# Patient Record
Sex: FEMALE | Race: WHITE | NOT HISPANIC OR LATINO | Employment: FULL TIME | URBAN - METROPOLITAN AREA
[De-identification: names, ages, dates, MRNs, and addresses within clinical notes are randomized per-mention and may not be internally consistent; named-entity substitution may affect disease eponyms.]

---

## 2021-07-15 ENCOUNTER — TELEPHONE (OUTPATIENT)
Dept: MEDICAL GROUP | Facility: MEDICAL CENTER | Age: 41
End: 2021-07-15

## 2021-07-15 NOTE — TELEPHONE ENCOUNTER
Phone Number Called: [phone]    Call outcome: Spoke to patient regarding message below.    Message: Reminding of apt

## 2021-07-16 ENCOUNTER — OFFICE VISIT (OUTPATIENT)
Dept: MEDICAL GROUP | Facility: MEDICAL CENTER | Age: 41
End: 2021-07-16
Payer: COMMERCIAL

## 2021-07-16 VITALS
TEMPERATURE: 96.7 F | WEIGHT: 142.53 LBS | BODY MASS INDEX: 22.37 KG/M2 | HEART RATE: 77 BPM | HEIGHT: 67 IN | SYSTOLIC BLOOD PRESSURE: 102 MMHG | DIASTOLIC BLOOD PRESSURE: 64 MMHG | OXYGEN SATURATION: 98 % | RESPIRATION RATE: 18 BRPM

## 2021-07-16 DIAGNOSIS — F90.2 ATTENTION DEFICIT HYPERACTIVITY DISORDER (ADHD), COMBINED TYPE: ICD-10-CM

## 2021-07-16 DIAGNOSIS — Z12.31 ENCOUNTER FOR SCREENING MAMMOGRAM FOR MALIGNANT NEOPLASM OF BREAST: ICD-10-CM

## 2021-07-16 DIAGNOSIS — F41.1 GAD (GENERALIZED ANXIETY DISORDER): ICD-10-CM

## 2021-07-16 PROCEDURE — 99204 OFFICE O/P NEW MOD 45 MIN: CPT | Performed by: FAMILY MEDICINE

## 2021-07-16 RX ORDER — DEXTROAMPHETAMINE SACCHARATE, AMPHETAMINE ASPARTATE, DEXTROAMPHETAMINE SULFATE AND AMPHETAMINE SULFATE 5; 5; 5; 5 MG/1; MG/1; MG/1; MG/1
TABLET ORAL
COMMUNITY
Start: 2021-02-04 | End: 2021-07-16

## 2021-07-16 RX ORDER — DEXTROAMPHETAMINE SACCHARATE, AMPHETAMINE ASPARTATE, DEXTROAMPHETAMINE SULFATE AND AMPHETAMINE SULFATE 2.5; 2.5; 2.5; 2.5 MG/1; MG/1; MG/1; MG/1
10 TABLET ORAL 2 TIMES DAILY
Qty: 60 TABLET | Refills: 0 | Status: SHIPPED | OUTPATIENT
Start: 2021-07-16 | End: 2021-08-13 | Stop reason: SDUPTHER

## 2021-07-16 RX ORDER — DEXTROAMPHETAMINE SACCHARATE, AMPHETAMINE ASPARTATE, DEXTROAMPHETAMINE SULFATE AND AMPHETAMINE SULFATE 2.5; 2.5; 2.5; 2.5 MG/1; MG/1; MG/1; MG/1
5 TABLET ORAL 2 TIMES DAILY
COMMUNITY
End: 2021-07-16 | Stop reason: SDUPTHER

## 2021-07-16 SDOH — ECONOMIC STABILITY: TRANSPORTATION INSECURITY
IN THE PAST 12 MONTHS, HAS THE LACK OF TRANSPORTATION KEPT YOU FROM MEDICAL APPOINTMENTS OR FROM GETTING MEDICATIONS?: NO

## 2021-07-16 SDOH — ECONOMIC STABILITY: HOUSING INSECURITY: IN THE LAST 12 MONTHS, HOW MANY PLACES HAVE YOU LIVED?: 2

## 2021-07-16 SDOH — ECONOMIC STABILITY: INCOME INSECURITY: HOW HARD IS IT FOR YOU TO PAY FOR THE VERY BASICS LIKE FOOD, HOUSING, MEDICAL CARE, AND HEATING?: NOT HARD AT ALL

## 2021-07-16 SDOH — ECONOMIC STABILITY: TRANSPORTATION INSECURITY
IN THE PAST 12 MONTHS, HAS LACK OF TRANSPORTATION KEPT YOU FROM MEETINGS, WORK, OR FROM GETTING THINGS NEEDED FOR DAILY LIVING?: NO

## 2021-07-16 SDOH — ECONOMIC STABILITY: TRANSPORTATION INSECURITY
IN THE PAST 12 MONTHS, HAS LACK OF RELIABLE TRANSPORTATION KEPT YOU FROM MEDICAL APPOINTMENTS, MEETINGS, WORK OR FROM GETTING THINGS NEEDED FOR DAILY LIVING?: NO

## 2021-07-16 SDOH — HEALTH STABILITY: PHYSICAL HEALTH: ON AVERAGE, HOW MANY MINUTES DO YOU ENGAGE IN EXERCISE AT THIS LEVEL?: 40 MIN

## 2021-07-16 SDOH — ECONOMIC STABILITY: INCOME INSECURITY: IN THE LAST 12 MONTHS, WAS THERE A TIME WHEN YOU WERE NOT ABLE TO PAY THE MORTGAGE OR RENT ON TIME?: NO

## 2021-07-16 SDOH — ECONOMIC STABILITY: HOUSING INSECURITY
IN THE LAST 12 MONTHS, WAS THERE A TIME WHEN YOU DID NOT HAVE A STEADY PLACE TO SLEEP OR SLEPT IN A SHELTER (INCLUDING NOW)?: NO

## 2021-07-16 SDOH — ECONOMIC STABILITY: FOOD INSECURITY: WITHIN THE PAST 12 MONTHS, THE FOOD YOU BOUGHT JUST DIDN'T LAST AND YOU DIDN'T HAVE MONEY TO GET MORE.: NEVER TRUE

## 2021-07-16 SDOH — HEALTH STABILITY: PHYSICAL HEALTH: ON AVERAGE, HOW MANY DAYS PER WEEK DO YOU ENGAGE IN MODERATE TO STRENUOUS EXERCISE (LIKE A BRISK WALK)?: 7 DAYS

## 2021-07-16 SDOH — HEALTH STABILITY: MENTAL HEALTH
STRESS IS WHEN SOMEONE FEELS TENSE, NERVOUS, ANXIOUS, OR CAN'T SLEEP AT NIGHT BECAUSE THEIR MIND IS TROUBLED. HOW STRESSED ARE YOU?: ONLY A LITTLE

## 2021-07-16 SDOH — ECONOMIC STABILITY: FOOD INSECURITY: WITHIN THE PAST 12 MONTHS, YOU WORRIED THAT YOUR FOOD WOULD RUN OUT BEFORE YOU GOT MONEY TO BUY MORE.: NEVER TRUE

## 2021-07-16 ASSESSMENT — PATIENT HEALTH QUESTIONNAIRE - PHQ9: CLINICAL INTERPRETATION OF PHQ2 SCORE: 0

## 2021-07-16 ASSESSMENT — LIFESTYLE VARIABLES
HOW MANY STANDARD DRINKS CONTAINING ALCOHOL DO YOU HAVE ON A TYPICAL DAY: 3 OR 4
HOW OFTEN DO YOU HAVE SIX OR MORE DRINKS ON ONE OCCASION: WEEKLY
HOW OFTEN DO YOU HAVE A DRINK CONTAINING ALCOHOL: 4 OR MORE TIMES A WEEK

## 2021-07-16 ASSESSMENT — SOCIAL DETERMINANTS OF HEALTH (SDOH)
HOW OFTEN DO YOU ATTENT MEETINGS OF THE CLUB OR ORGANIZATION YOU BELONG TO?: PATIENT DECLINED
HOW OFTEN DO YOU ATTEND CHURCH OR RELIGIOUS SERVICES?: PATIENT DECLINED
IN A TYPICAL WEEK, HOW MANY TIMES DO YOU TALK ON THE PHONE WITH FAMILY, FRIENDS, OR NEIGHBORS?: MORE THAN THREE TIMES A WEEK
HOW HARD IS IT FOR YOU TO PAY FOR THE VERY BASICS LIKE FOOD, HOUSING, MEDICAL CARE, AND HEATING?: NOT HARD AT ALL
HOW OFTEN DO YOU ATTENT MEETINGS OF THE CLUB OR ORGANIZATION YOU BELONG TO?: PATIENT DECLINED
HOW MANY DRINKS CONTAINING ALCOHOL DO YOU HAVE ON A TYPICAL DAY WHEN YOU ARE DRINKING: 3 OR 4
HOW OFTEN DO YOU ATTEND CHURCH OR RELIGIOUS SERVICES?: PATIENT DECLINED
HOW OFTEN DO YOU HAVE A DRINK CONTAINING ALCOHOL: 4 OR MORE TIMES A WEEK
HOW OFTEN DO YOU GET TOGETHER WITH FRIENDS OR RELATIVES?: TWICE A WEEK
IN A TYPICAL WEEK, HOW MANY TIMES DO YOU TALK ON THE PHONE WITH FAMILY, FRIENDS, OR NEIGHBORS?: MORE THAN THREE TIMES A WEEK
WITHIN THE PAST 12 MONTHS, YOU WORRIED THAT YOUR FOOD WOULD RUN OUT BEFORE YOU GOT THE MONEY TO BUY MORE: NEVER TRUE
DO YOU BELONG TO ANY CLUBS OR ORGANIZATIONS SUCH AS CHURCH GROUPS UNIONS, FRATERNAL OR ATHLETIC GROUPS, OR SCHOOL GROUPS?: NO
HOW OFTEN DO YOU GET TOGETHER WITH FRIENDS OR RELATIVES?: TWICE A WEEK
HOW OFTEN DO YOU HAVE SIX OR MORE DRINKS ON ONE OCCASION: WEEKLY
DO YOU BELONG TO ANY CLUBS OR ORGANIZATIONS SUCH AS CHURCH GROUPS UNIONS, FRATERNAL OR ATHLETIC GROUPS, OR SCHOOL GROUPS?: NO

## 2021-07-16 ASSESSMENT — ENCOUNTER SYMPTOMS
FEVER: 0
DIARRHEA: 0
COUGH: 0
NERVOUS/ANXIOUS: 1
CHILLS: 0
DIZZINESS: 0
BLOOD IN STOOL: 0
PALPITATIONS: 0
WEAKNESS: 0
CONSTIPATION: 0
ABDOMINAL PAIN: 0
WHEEZING: 0
VOMITING: 0
SHORTNESS OF BREATH: 0
DEPRESSION: 0

## 2021-07-16 NOTE — PROGRESS NOTES
FAMILY MEDICINE VISIT                                                               Chief complaint::Diagnoses of Attention deficit hyperactivity disorder (ADHD), combined type, MARJORIE (generalized anxiety disorder), and Encounter for screening mammogram for malignant neoplasm of breast were pertinent to this visit.    History of present illness: Deb Kaye is a 41 y.o. female who presented establish care, medication refill.    She was following with Dr. Florentino in [place].  She reports that she was diagnosed with ADHD in February 2021.  She reports that she had a lot of stressors last year.  Her daughter attempted suicide and her daughter's father got paraplegic after accident.  She was having a lot of anxiety and then had a bowel movement for 5 weeks so she was seen by physician for anxiety symptoms and had evaluation for ADHD also.  Her sister had history of ADHD.  She was started on Adderall 10 mg twice daily.  Since that she started this medication she has been feeling a lot better and able to focus and her anxiety is also a lot better.  No side effects with this medication.  She also uses marijuana sometimes at bedtime for sleep.  Her constipation symptoms improved after starting Adderall.    Review of systems:     Review of Systems   Constitutional: Negative for chills, fever and malaise/fatigue.   Respiratory: Negative for cough, shortness of breath and wheezing.    Cardiovascular: Negative for chest pain, palpitations and leg swelling.   Gastrointestinal: Negative for abdominal pain, blood in stool, constipation, diarrhea and vomiting.   Skin: Negative for rash.   Neurological: Negative for dizziness and weakness.   Psychiatric/Behavioral: Negative for depression. The patient is nervous/anxious.         Past Medical, Surgical and Family History:    Past Medical History:   Diagnosis Date   • Constipation      Past Surgical History:   Procedure Laterality Date   • TUBAL LIGATION  05/2019   • BREAST IMPLANT  "REVISION      Breast implant placed in June 2020   • PRIMARY C SECTION       Family History   Problem Relation Age of Onset   • Arthritis Mother         RA   • Anxiety disorder Mother    • Hypertension Mother    • Hyperlipidemia Mother    • Depression Mother    • Cancer Maternal Grandmother         Colon cancer   • Alzheimer's Disease Maternal Grandmother    • Depression Maternal Grandmother    • Anxiety disorder Maternal Grandmother    • ADD / ADHD Sister    • Depression Sister    • Anxiety disorder Sister         Social History:    Social History     Tobacco Use   • Smoking status: Current Every Day Smoker   • Smokeless tobacco: Never Used   • Tobacco comment: smoking on and off since age 18   Substance Use Topics   • Alcohol use: Yes     Alcohol/week: 4.2 oz     Types: 7 Glasses of wine per week   • Drug use: Yes     Types: Marijuana        Medications and Allergies:     Current Outpatient Medications   Medication Sig Dispense Refill   • amphetamine-dextroamphetamine (ADDERALL) 10 MG Tab Take 1 tablet by mouth 2 times a day for 30 days. 60 tablet 0     No current facility-administered medications for this visit.        Not on File    Vitals:    /64 (BP Location: Left arm, Patient Position: Sitting, BP Cuff Size: Adult)   Pulse 77   Temp 35.9 °C (96.7 °F) (Temporal)   Resp 18   Ht 1.689 m (5' 6.5\")   Wt 64.7 kg (142 lb 8.4 oz)   SpO2 98%  Body mass index is 22.66 kg/m².    Physical Exam:     Physical Exam  Constitutional:       General: She is not in acute distress.  HENT:      Head: Normocephalic and atraumatic.   Eyes:      Conjunctiva/sclera: Conjunctivae normal.   Cardiovascular:      Rate and Rhythm: Normal rate and regular rhythm.      Heart sounds: Normal heart sounds. No murmur heard.   No friction rub. No gallop.    Pulmonary:      Effort: Pulmonary effort is normal. No respiratory distress.      Breath sounds: Normal breath sounds. No wheezing or rales.   Musculoskeletal:         General: No " deformity.      Cervical back: Neck supple.   Skin:     Findings: No rash.   Neurological:      General: No focal deficit present.      Mental Status: She is alert. Mental status is at baseline.      Gait: Gait is intact.   Psychiatric:         Mood and Affect: Mood and affect normal.         Judgment: Judgment normal.         Assessment/Plan:    1. Attention deficit hyperactivity disorder (ADHD), combined type  Chronic health problem, improved after starting Adderall.  Medication sent for 1 month  Request records from previous physician regarding evaluation for ADHD.  We will send further medications after reviewing previous records.  Signed controlled substance treatment agreement today.  Discussed with patient about stopping marijuana as she is on Adderall and that will interact with this medication.  Counseled regarding long-term side effects of marijuana use.  She agrees to stop marijuana, will do urine drug screen at next visit.  Also counseled regarding smoking cessation.    Obtained and reviewed patient utilization report from [facility] pharmacy database on 7/16/2021 10:42 AM  prior to writing prescription for controlled substance II, III or IV per [place] bill . Based on assessment of the report,my physical exam if necessary and the patient's health problem, the prescription is medically necessary.     - Controlled Substance Treatment Agreement  - amphetamine-dextroamphetamine (ADDERALL) 10 MG Tab; Take 1 tablet by mouth 2 times a day for 30 days.  Dispense: 60 tablet; Refill: 0    2. MARJORIE (generalized anxiety disorder)  Chronic health problem, improved after starting Adderall.    3. Encounter for screening mammogram for malignant neoplasm of breast  Check mammogram.    - MA-SCREENING MAMMO BILAT W/IMPLANTS W/CLAUDIA W/CAD; Future     Scheduled for Pap smear with OB/GYN.  She will discuss with OB regarding evaluation for STDs.  I will review her previous records what blood work she did.  She reported she  did blood work in February    Please note that this dictation was created using voice recognition software. I have made every reasonable attempt to correct obvious errors, but I expect that there are errors of grammar and possibly content that I did not discover before finalizing the note.    Follow up in 3 months for medication follow-up.

## 2021-07-19 ENCOUNTER — DOCUMENTATION (OUTPATIENT)
Dept: MEDICAL GROUP | Facility: MEDICAL CENTER | Age: 41
End: 2021-07-19

## 2021-07-19 NOTE — PROGRESS NOTES
Reviewed previous records from [facility] from Eyal Florentino regarding evaluation for ADHD.  She is currently on Adderall 10 mg twice daily.

## 2021-08-13 DIAGNOSIS — F90.2 ATTENTION DEFICIT HYPERACTIVITY DISORDER (ADHD), COMBINED TYPE: ICD-10-CM

## 2021-08-13 RX ORDER — DEXTROAMPHETAMINE SACCHARATE, AMPHETAMINE ASPARTATE, DEXTROAMPHETAMINE SULFATE AND AMPHETAMINE SULFATE 2.5; 2.5; 2.5; 2.5 MG/1; MG/1; MG/1; MG/1
10 TABLET ORAL 2 TIMES DAILY
Qty: 60 TABLET | Refills: 0 | Status: SHIPPED | OUTPATIENT
Start: 2021-08-15 | End: 2021-09-14

## 2021-08-13 RX ORDER — DEXTROAMPHETAMINE SACCHARATE, AMPHETAMINE ASPARTATE, DEXTROAMPHETAMINE SULFATE AND AMPHETAMINE SULFATE 2.5; 2.5; 2.5; 2.5 MG/1; MG/1; MG/1; MG/1
10 TABLET ORAL 2 TIMES DAILY
Qty: 60 TABLET | Refills: 0 | Status: SHIPPED | OUTPATIENT
Start: 2021-09-14 | End: 2021-10-14

## 2021-08-16 ENCOUNTER — HOSPITAL ENCOUNTER (OUTPATIENT)
Facility: MEDICAL CENTER | Age: 41
End: 2021-08-16
Attending: OBSTETRICS & GYNECOLOGY
Payer: COMMERCIAL

## 2021-08-16 ENCOUNTER — HOSPITAL ENCOUNTER (OUTPATIENT)
Dept: LAB | Facility: MEDICAL CENTER | Age: 41
End: 2021-08-16
Attending: OBSTETRICS & GYNECOLOGY
Payer: COMMERCIAL

## 2021-08-16 ENCOUNTER — GYNECOLOGY VISIT (OUTPATIENT)
Dept: OBGYN | Facility: CLINIC | Age: 41
End: 2021-08-16
Payer: COMMERCIAL

## 2021-08-16 VITALS — SYSTOLIC BLOOD PRESSURE: 106 MMHG | WEIGHT: 138 LBS | BODY MASS INDEX: 21.94 KG/M2 | DIASTOLIC BLOOD PRESSURE: 76 MMHG

## 2021-08-16 DIAGNOSIS — Z01.419 WOMEN'S ANNUAL ROUTINE GYNECOLOGICAL EXAMINATION: ICD-10-CM

## 2021-08-16 LAB
HBV SURFACE AG SER QL: NORMAL
HIV 1+2 AB+HIV1 P24 AG SERPL QL IA: NORMAL
TREPONEMA PALLIDUM IGG+IGM AB [PRESENCE] IN SERUM OR PLASMA BY IMMUNOASSAY: NORMAL

## 2021-08-16 PROCEDURE — 86780 TREPONEMA PALLIDUM: CPT

## 2021-08-16 PROCEDURE — 87340 HEPATITIS B SURFACE AG IA: CPT

## 2021-08-16 PROCEDURE — 99386 PREV VISIT NEW AGE 40-64: CPT | Performed by: OBSTETRICS & GYNECOLOGY

## 2021-08-16 PROCEDURE — 87491 CHLMYD TRACH DNA AMP PROBE: CPT

## 2021-08-16 PROCEDURE — 87624 HPV HI-RISK TYP POOLED RSLT: CPT

## 2021-08-16 PROCEDURE — 36415 COLL VENOUS BLD VENIPUNCTURE: CPT

## 2021-08-16 PROCEDURE — 87389 HIV-1 AG W/HIV-1&-2 AB AG IA: CPT

## 2021-08-16 PROCEDURE — 87591 N.GONORRHOEAE DNA AMP PROB: CPT

## 2021-08-16 PROCEDURE — 88175 CYTOPATH C/V AUTO FLUID REDO: CPT

## 2021-08-16 NOTE — PROGRESS NOTES
Annual examination; new patient  This patient is a 41 y.o. female  using BTL for birth control.  Prior history of  section x2 with bilateral salpingectomy in utero recently moved here.  She states that her partner of 4 years has been unfaithful and would like full testing      /76 (BP Location: Right arm, Patient Position: Sitting)   Wt 62.6 kg (138 lb)   LMP 2021 (Approximate)   BMI 21.94 kg/m²     Physical examination;  Breast examination- No dominant masses, No skin retraction, No axillary adenopathy    Pelvic examination;  External genitalia-No visible lesions, urethra normal in appearance, O'Donnell's glands normal  Vagina-No blood or discharge, bladder normal in position without gross lesions  Cervix-No gross lesions, Pap smear taken  Uterus-Normal size and shape,  No tenderness  Adnexa No mass or tenderness    Abdomen-nondistended positive bowel sounds soft nontender without masses or hepatosplenomegaly    Impression;  Normal annual    Plan;  BTL for BC   Check PAP  Start Mammogram age 40 yrs.  Check RPR HIV hepatitis  Check GC CT cultures    Female chaperone present during entire history and physical examination

## 2021-08-16 NOTE — NON-PROVIDER
Pt here for new pt appt  Pt states is here to establish care and for annual exam. Patient would like STD testing included in pap   Pharmacy verified  Good #:[phone]   LMP:8/13/2021  PAP:  2 years ago wnl   BC: Tubal ligation   MAMMO: 5/2021 wnl

## 2021-08-17 DIAGNOSIS — Z01.419 WOMEN'S ANNUAL ROUTINE GYNECOLOGICAL EXAMINATION: ICD-10-CM

## 2021-08-18 LAB
C TRACH DNA GENITAL QL NAA+PROBE: NEGATIVE
CYTOLOGY REG CYTOL: NORMAL
N GONORRHOEA DNA GENITAL QL NAA+PROBE: NEGATIVE
SPECIMEN SOURCE: NORMAL

## 2021-08-19 LAB
HPV HR 12 DNA CVX QL NAA+PROBE: POSITIVE
HPV16 DNA SPEC QL NAA+PROBE: NEGATIVE
HPV18 DNA SPEC QL NAA+PROBE: NEGATIVE
SPECIMEN SOURCE: ABNORMAL

## 2021-08-31 DIAGNOSIS — R87.610 ASCUS OF CERVIX WITH NEGATIVE HIGH RISK HPV: ICD-10-CM

## 2021-09-02 ENCOUNTER — TELEPHONE (OUTPATIENT)
Dept: OBGYN | Facility: CLINIC | Age: 41
End: 2021-09-02

## 2021-09-02 NOTE — TELEPHONE ENCOUNTER
----- Message from Noah Witt M.D. sent at 8/31/2021  5:19 PM PDT -----  Please notify patient she needs to make appointment for colposcopy with me at my next available time slot.  I have placed a referral      9/2/2021 1431 called pt and she answered but stated she was extremely busy and was not able to talk at the moment. RN direct line phone# provided to call back.   9/3/2021 1309 pt called back and left message to call her back.  1314 called pt back and Pt notified of abnormal pap with +HPV and need colposcopy. Procedure explained to pt. All questions answered. Pt agreed and verbalized understanding. Pt transferred to [facility] to schedule appt with Dr. Witt.

## 2021-10-12 ENCOUNTER — GYNECOLOGY VISIT (OUTPATIENT)
Dept: OBGYN | Facility: CLINIC | Age: 41
End: 2021-10-12
Payer: COMMERCIAL

## 2021-10-12 ENCOUNTER — HOSPITAL ENCOUNTER (OUTPATIENT)
Facility: MEDICAL CENTER | Age: 41
End: 2021-10-12
Attending: OBSTETRICS & GYNECOLOGY
Payer: COMMERCIAL

## 2021-10-12 VITALS — SYSTOLIC BLOOD PRESSURE: 134 MMHG | BODY MASS INDEX: 22.26 KG/M2 | DIASTOLIC BLOOD PRESSURE: 82 MMHG | WEIGHT: 140 LBS

## 2021-10-12 DIAGNOSIS — R87.610 ASCUS WITH POSITIVE HIGH RISK HPV CERVICAL: Primary | ICD-10-CM

## 2021-10-12 DIAGNOSIS — R87.810 ASCUS WITH POSITIVE HIGH RISK HPV CERVICAL: Primary | ICD-10-CM

## 2021-10-12 LAB
INT CON NEG: NORMAL
INT CON POS: NORMAL
PATHOLOGY CONSULT NOTE: NORMAL
POC URINE PREGNANCY TEST: NEGATIVE

## 2021-10-12 PROCEDURE — 88305 TISSUE EXAM BY PATHOLOGIST: CPT

## 2021-10-12 PROCEDURE — 81025 URINE PREGNANCY TEST: CPT | Performed by: OBSTETRICS & GYNECOLOGY

## 2021-10-12 PROCEDURE — 57454 BX/CURETT OF CERVIX W/SCOPE: CPT | Performed by: OBSTETRICS & GYNECOLOGY

## 2021-10-12 NOTE — PROCEDURES
Procedure note; COLPOSCOPY    Indications; Pap smear; ASCUS with high risk HPV other subtypes    Informed consent obtained, consent signed    Urine pregnancy test negative    Vaginal speculum placed    3% acetic acid solution applied to cervix    Coloscopy performed    Entire transformation zone visualized    Findings; very small focal area of punctation at 9:00 o'clock small  Biopsy forceps removed entire lesion      Biopsies taken;    Endocervical curettage; taken  Ectocervical biopsies; 9:00 taken    Specimen sent to pathology    Patient has been instructed to make appointment with me to review test results within 7 to 10 days    Female chaperone present for entire procedure and history    Noah Witt MD

## 2021-10-12 NOTE — NON-PROVIDER
Pt here for Colpo procedure  Pt states is very nervous  Good#[phone]   Pharmacy verified  Consent Given   UPT Negative

## 2021-10-15 ENCOUNTER — OFFICE VISIT (OUTPATIENT)
Dept: MEDICAL GROUP | Facility: MEDICAL CENTER | Age: 41
End: 2021-10-15
Payer: COMMERCIAL

## 2021-10-15 VITALS
WEIGHT: 141.76 LBS | HEART RATE: 68 BPM | TEMPERATURE: 97.3 F | BODY MASS INDEX: 22.25 KG/M2 | OXYGEN SATURATION: 100 % | DIASTOLIC BLOOD PRESSURE: 64 MMHG | SYSTOLIC BLOOD PRESSURE: 118 MMHG | HEIGHT: 67 IN

## 2021-10-15 DIAGNOSIS — F90.2 ATTENTION DEFICIT HYPERACTIVITY DISORDER (ADHD), COMBINED TYPE: ICD-10-CM

## 2021-10-15 DIAGNOSIS — F41.1 GAD (GENERALIZED ANXIETY DISORDER): ICD-10-CM

## 2021-10-15 PROCEDURE — 99214 OFFICE O/P EST MOD 30 MIN: CPT | Performed by: FAMILY MEDICINE

## 2021-10-15 RX ORDER — DEXTROAMPHETAMINE SACCHARATE, AMPHETAMINE ASPARTATE, DEXTROAMPHETAMINE SULFATE AND AMPHETAMINE SULFATE 2.5; 2.5; 2.5; 2.5 MG/1; MG/1; MG/1; MG/1
10 TABLET ORAL 2 TIMES DAILY
COMMUNITY
End: 2021-10-15 | Stop reason: SDUPTHER

## 2021-10-15 RX ORDER — DEXTROAMPHETAMINE SACCHARATE, AMPHETAMINE ASPARTATE, DEXTROAMPHETAMINE SULFATE AND AMPHETAMINE SULFATE 2.5; 2.5; 2.5; 2.5 MG/1; MG/1; MG/1; MG/1
10 TABLET ORAL 2 TIMES DAILY
Qty: 60 TABLET | Refills: 0 | Status: SHIPPED | OUTPATIENT
Start: 2021-12-14 | End: 2022-01-13

## 2021-10-15 RX ORDER — DEXTROAMPHETAMINE SACCHARATE, AMPHETAMINE ASPARTATE, DEXTROAMPHETAMINE SULFATE AND AMPHETAMINE SULFATE 2.5; 2.5; 2.5; 2.5 MG/1; MG/1; MG/1; MG/1
10 TABLET ORAL 2 TIMES DAILY
Qty: 60 TABLET | Refills: 0 | Status: SHIPPED | OUTPATIENT
Start: 2021-10-15 | End: 2021-11-14

## 2021-10-15 RX ORDER — DEXTROAMPHETAMINE SACCHARATE, AMPHETAMINE ASPARTATE, DEXTROAMPHETAMINE SULFATE AND AMPHETAMINE SULFATE 2.5; 2.5; 2.5; 2.5 MG/1; MG/1; MG/1; MG/1
10 TABLET ORAL 2 TIMES DAILY
Qty: 60 TABLET | Refills: 0 | Status: SHIPPED | OUTPATIENT
Start: 2021-11-14 | End: 2021-12-14

## 2021-10-15 ASSESSMENT — ENCOUNTER SYMPTOMS
FEVER: 0
CHILLS: 0

## 2021-10-15 NOTE — PROGRESS NOTES
FAMILY MEDICINE VISIT                                                               Chief complaint::Diagnoses of Attention deficit hyperactivity disorder (ADHD), combined type and MARJORIE (generalized anxiety disorder) were pertinent to this visit.    History of present illness: Deb Kaye is a 41 y.o. female who presented for follow-up, medication refill.    She is currently on Adderall 10 mg twice daily for ADHD symptoms.  She is doing well with this medication.  No side effects with this medication.  I reviewed previous records regarding ADHD evaluation.  She reports that she has some anxiety symptoms but able to manage symptoms currently.  She reports that running helps with these symptoms.    She is following with OB/GYN regarding abnormal Pap smear.  She had colposcopy done and she asked questions regarding results for colposcopy results.      Review of systems:     Review of Systems   Constitutional: Negative for chills, fever and malaise/fatigue.   Psychiatric/Behavioral:        Anxiety symptoms sometimes        Past Medical, Surgical and Family History:    Past Medical History:   Diagnosis Date   • Constipation      Past Surgical History:   Procedure Laterality Date   • TUBAL LIGATION  05/2019   • BREAST IMPLANT REVISION      Breast implant placed in June 2020   • PRIMARY C SECTION       Family History   Problem Relation Age of Onset   • Arthritis Mother         RA   • Anxiety disorder Mother    • Hypertension Mother    • Hyperlipidemia Mother    • Depression Mother    • Cancer Maternal Grandmother         Colon cancer   • Alzheimer's Disease Maternal Grandmother    • Depression Maternal Grandmother    • Anxiety disorder Maternal Grandmother    • ADD / ADHD Sister    • Depression Sister    • Anxiety disorder Sister         Social History:    Social History     Tobacco Use   • Smoking status: Current Every Day Smoker   • Smokeless tobacco: Never Used   • Tobacco comment: smoking on and off since age 18  "  Substance Use Topics   • Alcohol use: Yes     Alcohol/week: 4.2 oz     Types: 7 Glasses of wine per week   • Drug use: Yes     Types: Marijuana        Medications and Allergies:     Current Outpatient Medications   Medication Sig Dispense Refill   • amphetamine-dextroamphetamine (ADDERALL) 10 MG Tab Take 1 Tablet by mouth 2 times a day for 30 days. 60 Tablet 0   • [START ON 11/14/2021] amphetamine-dextroamphetamine (ADDERALL) 10 MG Tab Take 1 Tablet by mouth 2 times a day for 30 days. 60 Tablet 0   • [START ON 12/14/2021] amphetamine-dextroamphetamine (ADDERALL) 10 MG Tab Take 1 Tablet by mouth 2 times a day for 30 days. 60 Tablet 0     No current facility-administered medications for this visit.        No Known Allergies    Vitals:    /64 (BP Location: Left arm, Patient Position: Sitting, BP Cuff Size: Adult)   Pulse 68   Temp 36.3 °C (97.3 °F)   Ht 1.69 m (5' 6.54\")   Wt 64.3 kg (141 lb 12.1 oz)   SpO2 100%  Body mass index is 22.51 kg/m².    Physical Exam:     Physical Exam  Constitutional:       General: She is not in acute distress.  HENT:      Head: Normocephalic and atraumatic.   Eyes:      Conjunctiva/sclera: Conjunctivae normal.   Cardiovascular:      Rate and Rhythm: Normal rate.   Pulmonary:      Effort: Pulmonary effort is normal. No respiratory distress.   Musculoskeletal:         General: No deformity.      Cervical back: Neck supple.   Skin:     Findings: No rash.   Neurological:      Mental Status: She is alert.      Gait: Gait is intact.   Psychiatric:         Mood and Affect: Mood and affect normal.         Judgment: Judgment normal.          Assessment/Plan:    1. Attention deficit hyperactivity disorder (ADHD), combined type  Chronic health problem, stable, continue Adderall 10 mg twice daily.  Checked PDMP.  Medication sent for 3 months.  We will follow up in 3 months regarding this prescription.    Obtained and reviewed patient utilization report from [facility] pharmacy database " on 10/15/2021 12:25 PM  prior to writing prescription for controlled substance II, III or IV per [place] bill . Based on assessment of the report,my physical exam if necessary and the patient's health problem, the prescription is medically necessary.     - amphetamine-dextroamphetamine (ADDERALL) 10 MG Tab; Take 1 Tablet by mouth 2 times a day for 30 days.  Dispense: 60 Tablet; Refill: 0  - amphetamine-dextroamphetamine (ADDERALL) 10 MG Tab; Take 1 Tablet by mouth 2 times a day for 30 days.  Dispense: 60 Tablet; Refill: 0  - amphetamine-dextroamphetamine (ADDERALL) 10 MG Tab; Take 1 Tablet by mouth 2 times a day for 30 days.  Dispense: 60 Tablet; Refill: 0    2. MARJORIE (generalized anxiety disorder)  Chronic health problem, currently able to manage symptoms.  Continue to monitor.       Recommended to follow-up with OB/GYN regarding colposcopy results.    Please note that this dictation was created using voice recognition software. I have made every reasonable attempt to correct obvious errors, but I expect that there are errors of grammar and possibly content that I did not discover before finalizing the note.    Follow up in 3 months for medication follow up

## 2021-12-08 ENCOUNTER — HOSPITAL ENCOUNTER (OUTPATIENT)
Facility: MEDICAL CENTER | Age: 41
End: 2021-12-08
Attending: FAMILY MEDICINE
Payer: COMMERCIAL

## 2021-12-08 ENCOUNTER — OFFICE VISIT (OUTPATIENT)
Dept: MEDICAL GROUP | Facility: MEDICAL CENTER | Age: 41
End: 2021-12-08
Payer: COMMERCIAL

## 2021-12-08 VITALS
TEMPERATURE: 99 F | HEIGHT: 67 IN | DIASTOLIC BLOOD PRESSURE: 64 MMHG | RESPIRATION RATE: 18 BRPM | BODY MASS INDEX: 21.59 KG/M2 | HEART RATE: 96 BPM | OXYGEN SATURATION: 98 % | WEIGHT: 137.57 LBS | SYSTOLIC BLOOD PRESSURE: 108 MMHG

## 2021-12-08 DIAGNOSIS — R09.81 NASAL CONGESTION: ICD-10-CM

## 2021-12-08 DIAGNOSIS — R05.8 COUGH WITH SPUTUM: ICD-10-CM

## 2021-12-08 LAB
EXTERNAL QUALITY CONTROL: NORMAL
SARS-COV+SARS-COV-2 AG RESP QL IA.RAPID: NEGATIVE

## 2021-12-08 PROCEDURE — U0003 INFECTIOUS AGENT DETECTION BY NUCLEIC ACID (DNA OR RNA); SEVERE ACUTE RESPIRATORY SYNDROME CORONAVIRUS 2 (SARS-COV-2) (CORONAVIRUS DISEASE [COVID-19]), AMPLIFIED PROBE TECHNIQUE, MAKING USE OF HIGH THROUGHPUT TECHNOLOGIES AS DESCRIBED BY CMS-2020-01-R: HCPCS

## 2021-12-08 PROCEDURE — 87426 SARSCOV CORONAVIRUS AG IA: CPT | Performed by: FAMILY MEDICINE

## 2021-12-08 PROCEDURE — 99213 OFFICE O/P EST LOW 20 MIN: CPT | Performed by: FAMILY MEDICINE

## 2021-12-08 PROCEDURE — U0005 INFEC AGEN DETEC AMPLI PROBE: HCPCS

## 2021-12-08 RX ORDER — AZITHROMYCIN 250 MG/1
TABLET, FILM COATED ORAL
Qty: 6 TABLET | Refills: 0 | Status: SHIPPED | OUTPATIENT
Start: 2021-12-08 | End: 2022-01-14

## 2021-12-08 ASSESSMENT — ENCOUNTER SYMPTOMS
COUGH: 1
SPUTUM PRODUCTION: 1
FEVER: 0
CHILLS: 0
PALPITATIONS: 0
SORE THROAT: 0
WHEEZING: 0
SHORTNESS OF BREATH: 1
SINUS PAIN: 0

## 2021-12-08 NOTE — LETTER
December 8, 2021       Patient: Deb Kaye   YOB: 1980   Date of Visit: 12/8/2021         To Whom It May Concern:    Deb Kaye was seen in office on 12/8/2021. She was tested for COVID. Per protocol, I recommend to quarantine until we get test results.    If you have any questions or concerns, please don't hesitate to call [phone]          Sincerely,          Segun Rodrigez M.D.  Electronically Signed

## 2021-12-08 NOTE — PROGRESS NOTES
"FAMILY MEDICINE VISIT                                                               Chief complaint::Diagnoses of Nasal congestion and Cough with sputum were pertinent to this visit.    History of present illness: Deb Kaye is a 41 y.o. female who presented for nasal congestion, cough.    She reports that she was sick 3 weeks ago with nasal congestion and cough, took Mucinex and allergy pill at that time and that helped with symptoms. She restarted having symptoms 1 week ago. She is having nasal congestion, cough with sputum, reports that discharge is in green in color. She reports shortness of breath, attributes to anxiety symptoms. No fever. No other associated symptoms. Her boyfriend got recently diagnosed with sinusitis and was prescribed Z-Wilfredo.    Review of systems:     Review of Systems   Constitutional: Negative for chills and fever.   HENT: Positive for congestion. Negative for ear discharge, ear pain, sinus pain and sore throat.    Respiratory: Positive for cough, sputum production and shortness of breath. Negative for wheezing.    Cardiovascular: Negative for chest pain, palpitations and leg swelling.        Medications and Allergies:     Current Outpatient Medications   Medication Sig Dispense Refill   • amphetamine-dextroamphetamine (ADDERALL) 10 MG Tab Take 1 Tablet by mouth 2 times a day for 30 days. 60 Tablet 0   • [START ON 12/14/2021] amphetamine-dextroamphetamine (ADDERALL) 10 MG Tab Take 1 Tablet by mouth 2 times a day for 30 days. 60 Tablet 0     No current facility-administered medications for this visit.          Vitals:    /64 (BP Location: Left arm, Patient Position: Sitting, BP Cuff Size: Adult)   Pulse 96   Temp 37.2 °C (99 °F) (Temporal)   Resp 18   Ht 1.69 m (5' 6.54\")   Wt 62.4 kg (137 lb 9.1 oz)   SpO2 98%  Body mass index is 21.84 kg/m².    Physical Exam:     Physical Exam  Constitutional:       General: She is not in acute distress.  HENT:      Head: Normocephalic " and atraumatic.      Right Ear: Tympanic membrane, ear canal and external ear normal.      Left Ear: Tympanic membrane, ear canal and external ear normal.      Nose: Mucosal edema and congestion present.      Mouth/Throat:      Mouth: Mucous membranes are moist.      Pharynx: Oropharynx is clear. No pharyngeal swelling or posterior oropharyngeal erythema.      Tonsils: No tonsillar exudate or tonsillar abscesses.   Eyes:      Conjunctiva/sclera: Conjunctivae normal.   Cardiovascular:      Rate and Rhythm: Normal rate and regular rhythm.      Heart sounds: Normal heart sounds. No murmur heard.  No friction rub. No gallop.    Pulmonary:      Effort: Pulmonary effort is normal. No respiratory distress.      Breath sounds: Normal breath sounds. No wheezing or rales.   Musculoskeletal:         General: No deformity.      Cervical back: Neck supple.   Neurological:      Mental Status: She is alert.      Gait: Gait is intact.   Psychiatric:         Mood and Affect: Mood and affect normal.         Judgment: Judgment normal.            Assessment/Plan:    1. Nasal congestion  - POCT SARS-COV Antigen SUBHA (Symptomatic Only)  - azithromycin (ZITHROMAX) 250 MG Tab; Take 2 tablet by mouth on first day and take 1 tablet by mouth daily for 4 days  Dispense: 6 Tablet; Refill: 0  - SARS-CoV-2 PCR (24 hour In-House): Collect NP swab in [facility]; Future    2. Cough with sputum  - POCT SARS-COV Antigen SUBHA (Symptomatic Only)  - azithromycin (ZITHROMAX) 250 MG Tab; Take 2 tablet by mouth on first day and take 1 tablet by mouth daily for 4 days  Dispense: 6 Tablet; Refill: 0  - SARS-CoV-2 PCR (24 hour In-House): Collect NP swab in [facility]; Future      Covid test in office came back negative, send it for PCR.  Oxygen saturations are normal, she is mildly tachycardic as compared to her basal heart rate, temperature at 99. Since she is having symptoms for 7 days and she is having green sputum, prescribed azithromycin for current symptoms as it is  likely changing into bacterial infection.  Recommend to quarantine at home as we are checking for Covid, work note given.    Please note that this dictation was created using voice recognition software. I have made every reasonable attempt to correct obvious errors, but I expect that there are errors of grammar and possibly content that I did not discover before finalizing the note.    Follow up as needed if symptoms not getting better.

## 2021-12-09 DIAGNOSIS — R05.8 COUGH WITH SPUTUM: ICD-10-CM

## 2021-12-09 DIAGNOSIS — R09.81 NASAL CONGESTION: ICD-10-CM

## 2021-12-09 LAB
COVID ORDER STATUS COVID19: NORMAL
SARS-COV-2 RNA RESP QL NAA+PROBE: NOTDETECTED
SPECIMEN SOURCE: NORMAL

## 2021-12-19 DIAGNOSIS — F90.2 ATTENTION DEFICIT HYPERACTIVITY DISORDER (ADHD), COMBINED TYPE: ICD-10-CM

## 2021-12-20 RX ORDER — DEXTROAMPHETAMINE SACCHARATE, AMPHETAMINE ASPARTATE, DEXTROAMPHETAMINE SULFATE AND AMPHETAMINE SULFATE 2.5; 2.5; 2.5; 2.5 MG/1; MG/1; MG/1; MG/1
10 TABLET ORAL 2 TIMES DAILY
Qty: 60 TABLET | Refills: 0 | OUTPATIENT
Start: 2022-01-13 | End: 2022-02-12

## 2022-01-14 ENCOUNTER — OFFICE VISIT (OUTPATIENT)
Dept: MEDICAL GROUP | Facility: MEDICAL CENTER | Age: 42
End: 2022-01-14
Payer: COMMERCIAL

## 2022-01-14 VITALS
TEMPERATURE: 97.3 F | DIASTOLIC BLOOD PRESSURE: 72 MMHG | SYSTOLIC BLOOD PRESSURE: 116 MMHG | HEIGHT: 67 IN | WEIGHT: 138.01 LBS | OXYGEN SATURATION: 100 % | HEART RATE: 75 BPM | BODY MASS INDEX: 21.66 KG/M2

## 2022-01-14 DIAGNOSIS — F90.2 ATTENTION DEFICIT HYPERACTIVITY DISORDER (ADHD), COMBINED TYPE: ICD-10-CM

## 2022-01-14 DIAGNOSIS — M54.9 UPPER BACK PAIN: ICD-10-CM

## 2022-01-14 DIAGNOSIS — M25.512 ACUTE PAIN OF LEFT SHOULDER: ICD-10-CM

## 2022-01-14 DIAGNOSIS — F41.9 ANXIETY: ICD-10-CM

## 2022-01-14 PROCEDURE — 99214 OFFICE O/P EST MOD 30 MIN: CPT | Performed by: FAMILY MEDICINE

## 2022-01-14 RX ORDER — DEXTROAMPHETAMINE SACCHARATE, AMPHETAMINE ASPARTATE MONOHYDRATE, DEXTROAMPHETAMINE SULFATE AND AMPHETAMINE SULFATE 2.5; 2.5; 2.5; 2.5 MG/1; MG/1; MG/1; MG/1
20 CAPSULE, EXTENDED RELEASE ORAL DAILY
Qty: 60 CAPSULE | Refills: 0 | Status: SHIPPED | OUTPATIENT
Start: 2022-02-21 | End: 2022-01-24 | Stop reason: SDUPTHER

## 2022-01-14 RX ORDER — HYDROXYZINE HYDROCHLORIDE 25 MG/1
25 TABLET, FILM COATED ORAL 3 TIMES DAILY PRN
Qty: 30 TABLET | Refills: 0 | Status: SHIPPED | OUTPATIENT
Start: 2022-01-14 | End: 2022-02-24 | Stop reason: SDUPTHER

## 2022-01-14 ASSESSMENT — ENCOUNTER SYMPTOMS
FEVER: 0
CHILLS: 0

## 2022-01-14 ASSESSMENT — PATIENT HEALTH QUESTIONNAIRE - PHQ9: CLINICAL INTERPRETATION OF PHQ2 SCORE: 0

## 2022-01-14 NOTE — PROGRESS NOTES
"FAMILY MEDICINE VISIT                                                               Chief complaint::Diagnoses of Attention deficit hyperactivity disorder (ADHD), combined type, Anxiety, Acute pain of left shoulder, and Upper back pain were pertinent to this visit.    History of present illness: Deb Kaye is a 41 y.o. female who presented for ADHD medication follow-up, anxiety symptoms, left shoulder and upper back pain.      She has history of ADHD, currently on Adderall 10 mg twice daily.  No side effects with this medication.  Reports that sometimes she gets anxiety symptoms.  Previously when she was started on Adderall and her anxiety symptoms felt significantly better.  She reports that she had some stressors at home but now doing well.    She is also having left shoulder pain and upper back pain and following with chiropractor for muscle strain.  She does stretches at home.  No trauma.      Review of systems:     Review of Systems   Constitutional: Negative for chills and fever.   Musculoskeletal:        Upper back pain, shoulder pain   Psychiatric/Behavioral:        Anxiety symptoms sometimes        Medications and Allergies:     Current Outpatient Medications   Medication Sig Dispense Refill   • [START ON 2/21/2022] amphetamine-dextroamphetamine XR (ADDERALL XR) 10 MG CAPSULE SR 24 HR Take 2 Capsules by mouth every day for 30 days. 60 Capsule 0   • hydrOXYzine HCl (ATARAX) 25 MG Tab Take 1 Tablet by mouth 3 times a day as needed for Anxiety. 30 Tablet 0     No current facility-administered medications for this visit.          Vitals:    /72 (BP Location: Left arm, Patient Position: Sitting, BP Cuff Size: Adult)   Pulse 75   Temp 36.3 °C (97.3 °F) (Temporal)   Ht 1.689 m (5' 6.5\")   Wt 62.6 kg (138 lb 0.1 oz)   SpO2 100%  Body mass index is 21.94 kg/m².    Physical Exam:     Physical Exam  Constitutional:       General: She is not in acute distress.     Appearance: She is well-groomed. "   HENT:      Head: Normocephalic and atraumatic.   Eyes:      Conjunctiva/sclera: Conjunctivae normal.   Cardiovascular:      Rate and Rhythm: Normal rate.   Pulmonary:      Effort: Pulmonary effort is normal. No respiratory distress.   Musculoskeletal:         General: No deformity.      Cervical back: Neck supple.      Comments: Tenderness on palpation around left scapula, full range of motion at left shoulder   Skin:     Findings: No rash.   Neurological:      Mental Status: She is alert.      Gait: Gait is intact.   Psychiatric:         Mood and Affect: Mood and affect normal.         Judgment: Judgment normal.          Assessment/Plan:    1. Attention deficit hyperactivity disorder (ADHD), combined type  Chronic health problem, no side effects with Adderall.  Change Adderall to XR to have more long-lasting effect.  Take 2 tablets in the morning.  Urine drug screen collected today.  Checked PDMP.  She is going to change pharmacy as she bought new home.  We will fill the prescription for 1 month and she will let us know via [employer] when she changes her pharmacy then will send 2 more prescriptions to pharmacy before her next appointment.    Obtained and reviewed patient utilization report from [facility] pharmacy database on 1/14/2022 8:58 AM  prior to writing prescription for controlled substance II, III or IV per [place] bill . Based on assessment of the report,my physical exam if necessary and the patient's health problem, the prescription is medically necessary.     - amphetamine-dextroamphetamine XR (ADDERALL XR) 10 MG CAPSULE SR 24 HR; Take 2 Capsules by mouth every day for 30 days.  Dispense: 60 Capsule; Refill: 0    2. Anxiety  Chronic health problem, discussed with patient about as needed medication.  Prescribed Atarax as needed for anxiety symptoms.  Discussed that this medication can cause drowsiness.      - hydrOXYzine HCl (ATARAX) 25 MG Tab; Take 1 Tablet by mouth 3 times a day as needed for  Anxiety.  Dispense: 30 Tablet; Refill: 0    3. Acute pain of left shoulder  - Referral to Chiropractic    4. Upper back pain  - Referral to Chiropractic    New health problem, likely muscle strain, continue to follow-up with chiropractor.  If symptoms are not getting better then we can refer her to physical therapy.    Please note that this dictation was created using voice recognition software. I have made every reasonable attempt to correct obvious errors, but I expect that there are errors of grammar and possibly content that I did not discover before finalizing the note.    Follow up in 3 months for medication follow-up.

## 2022-01-21 ENCOUNTER — PATIENT MESSAGE (OUTPATIENT)
Dept: MEDICAL GROUP | Facility: MEDICAL CENTER | Age: 42
End: 2022-01-21

## 2022-01-21 DIAGNOSIS — F90.2 ATTENTION DEFICIT HYPERACTIVITY DISORDER (ADHD), COMBINED TYPE: ICD-10-CM

## 2022-01-24 RX ORDER — DEXTROAMPHETAMINE SACCHARATE, AMPHETAMINE ASPARTATE MONOHYDRATE, DEXTROAMPHETAMINE SULFATE AND AMPHETAMINE SULFATE 2.5; 2.5; 2.5; 2.5 MG/1; MG/1; MG/1; MG/1
20 CAPSULE, EXTENDED RELEASE ORAL DAILY
Qty: 60 CAPSULE | Refills: 0 | Status: SHIPPED | OUTPATIENT
Start: 2022-01-24 | End: 2022-01-26

## 2022-01-24 NOTE — TELEPHONE ENCOUNTER
From: Deb Kaye  To: Physician Segun Rodrigez  Sent: 1/21/2022 6:10 PM PST  Subject: January refill     Hi again,   I didn’t have a response to my message yesterday. I went to the pharmacy to  my January refill and they said the prescription that was called in didn’t start until 2/21/22. I am out of my medication at this time and need to have this resolved please.   My 1/14/22 appointment should have set me up with 3 refills starting in January.   Thank you.   Deb

## 2022-02-24 DIAGNOSIS — F90.2 ATTENTION DEFICIT HYPERACTIVITY DISORDER (ADHD), COMBINED TYPE: ICD-10-CM

## 2022-02-24 DIAGNOSIS — F41.9 ANXIETY: ICD-10-CM

## 2022-02-24 RX ORDER — DEXTROAMPHETAMINE SACCHARATE, AMPHETAMINE ASPARTATE, DEXTROAMPHETAMINE SULFATE AND AMPHETAMINE SULFATE 2.5; 2.5; 2.5; 2.5 MG/1; MG/1; MG/1; MG/1
10 TABLET ORAL 2 TIMES DAILY
Qty: 60 TABLET | Refills: 0 | Status: CANCELLED | OUTPATIENT
Start: 2022-02-24 | End: 2022-03-26

## 2022-02-24 RX ORDER — HYDROXYZINE HYDROCHLORIDE 25 MG/1
25 TABLET, FILM COATED ORAL 3 TIMES DAILY PRN
Qty: 30 TABLET | Refills: 2 | Status: SHIPPED | OUTPATIENT
Start: 2022-02-24 | End: 2024-03-07 | Stop reason: SDUPTHER

## 2022-02-24 NOTE — TELEPHONE ENCOUNTER
I called pharmacy and talk to the pharmacy staff and discussed about his Adderall prescription as this was filled by pharmacy too soon.  Patient contacted us as Adderall XR prescription was about $300 and asked us to cancel this prescription and prescribed regular Adderall.  My medical assistant Rhonda called the pharmacy and canceled this prescription.  There is documentation in patient's chart that we call the pharmacy and cancel this prescription.  Pharmacy staff reports that there is no documentation on diet and about canceling this prescription.  I discussed with them that they are supposed to check PDMP before refilling controlled prescription.  Pharmacy staff reports that pharmacist is supposed to check PDMP and this was missed on their part as we did cancel this Adderall extended release prescription on our side.    Adderall regular prescription was filled by pharmacy on 1/26/20222and Adderall XL prescription filled by pharmacy on 1/31/2022.    I called patient and discussed about the situation that I cannot refill her Adderall prescription currently as her last 2 prescriptions were filled too soon by pharmacy.  Patient reports understanding.  She is not due for her prescription until end of March.

## 2022-02-24 NOTE — TELEPHONE ENCOUNTER
Please call pharmacy as Adderall prescription was filled on 1/26 and again on 1/31.  We called the pharmacy previously and canceled Adderall extended release prescription.  Not sure why they filled this prescription.

## 2022-03-23 ENCOUNTER — PATIENT MESSAGE (OUTPATIENT)
Dept: MEDICAL GROUP | Facility: MEDICAL CENTER | Age: 42
End: 2022-03-23
Payer: COMMERCIAL

## 2022-03-23 DIAGNOSIS — F90.2 ATTENTION DEFICIT HYPERACTIVITY DISORDER (ADHD), COMBINED TYPE: ICD-10-CM

## 2022-03-23 RX ORDER — DEXTROAMPHETAMINE/AMPHETAMINE 10 MG
CAPSULE, EXT RELEASE 24 HR ORAL
Qty: 30 CAPSULE | Status: CANCELLED
Start: 2022-03-23

## 2022-03-23 RX ORDER — DEXTROAMPHETAMINE/AMPHETAMINE 10 MG
CAPSULE, EXT RELEASE 24 HR ORAL
COMMUNITY
Start: 2022-01-26 | End: 2022-03-23

## 2022-03-23 RX ORDER — DEXTROAMPHETAMINE SACCHARATE, AMPHETAMINE ASPARTATE, DEXTROAMPHETAMINE SULFATE AND AMPHETAMINE SULFATE 2.5; 2.5; 2.5; 2.5 MG/1; MG/1; MG/1; MG/1
10 TABLET ORAL 2 TIMES DAILY
Qty: 60 TABLET | Refills: 0 | Status: SHIPPED | OUTPATIENT
Start: 2022-03-26 | End: 2022-04-15 | Stop reason: SDUPTHER

## 2022-03-23 NOTE — TELEPHONE ENCOUNTER
From: Deb Kaye  To: Physician Segun Rodrgiez  Sent: 3/23/2022 7:40 AM PDT  Subject: March 26 refill     Good Morning,  May I please have my March refill of Aderall sent to the [facility] pharmacy in [place]? May I also request the non-time release medication please? The time release pills give me a terrible headache. The generic ones I had in January are preferred.   Thank you.   Deb

## 2022-04-15 ENCOUNTER — TELEMEDICINE (OUTPATIENT)
Dept: MEDICAL GROUP | Facility: MEDICAL CENTER | Age: 42
End: 2022-04-15
Payer: COMMERCIAL

## 2022-04-15 VITALS — HEIGHT: 67 IN | WEIGHT: 137 LBS | BODY MASS INDEX: 21.5 KG/M2

## 2022-04-15 DIAGNOSIS — F41.1 GAD (GENERALIZED ANXIETY DISORDER): ICD-10-CM

## 2022-04-15 DIAGNOSIS — F90.2 ATTENTION DEFICIT HYPERACTIVITY DISORDER (ADHD), COMBINED TYPE: ICD-10-CM

## 2022-04-15 DIAGNOSIS — T14.8XXA MUSCLE STRAIN: ICD-10-CM

## 2022-04-15 PROCEDURE — 99214 OFFICE O/P EST MOD 30 MIN: CPT | Mod: 95 | Performed by: FAMILY MEDICINE

## 2022-04-15 RX ORDER — DEXTROAMPHETAMINE SACCHARATE, AMPHETAMINE ASPARTATE, DEXTROAMPHETAMINE SULFATE AND AMPHETAMINE SULFATE 2.5; 2.5; 2.5; 2.5 MG/1; MG/1; MG/1; MG/1
10 TABLET ORAL 2 TIMES DAILY
Qty: 60 TABLET | Refills: 0 | Status: SHIPPED | OUTPATIENT
Start: 2022-06-24 | End: 2022-06-06

## 2022-04-15 RX ORDER — DEXTROAMPHETAMINE SACCHARATE, AMPHETAMINE ASPARTATE, DEXTROAMPHETAMINE SULFATE AND AMPHETAMINE SULFATE 2.5; 2.5; 2.5; 2.5 MG/1; MG/1; MG/1; MG/1
10 TABLET ORAL 2 TIMES DAILY
Qty: 60 TABLET | Refills: 0 | Status: SHIPPED | OUTPATIENT
Start: 2022-05-25 | End: 2022-05-04

## 2022-04-15 RX ORDER — DEXTROAMPHETAMINE SACCHARATE, AMPHETAMINE ASPARTATE, DEXTROAMPHETAMINE SULFATE AND AMPHETAMINE SULFATE 2.5; 2.5; 2.5; 2.5 MG/1; MG/1; MG/1; MG/1
10 TABLET ORAL 2 TIMES DAILY
Qty: 60 TABLET | Refills: 0 | Status: SHIPPED | OUTPATIENT
Start: 2022-04-25 | End: 2022-05-04 | Stop reason: SDUPTHER

## 2022-04-15 NOTE — PROGRESS NOTES
Virtual Visit: Established Patient   This visit was conducted via Zoom using secure and encrypted videoconferencing technology.   The patient was in her office in the [facility].    The patient's identity was confirmed and verbal consent was obtained for this virtual visit.     Subjective:   CC:   Chief Complaint   Patient presents with   • Follow-Up       Deb Kaye is a 41 y.o. female, a very pleasant patient presenting for follow up for medication.    Problem   Muscle Strain    She reports muscle pain at left upper back around the scapular region.  Did acupuncture, chiropractor therapy.  Has good posture in sitting position.     Attention Deficit Hyperactivity Disorder (Adhd), Combined Type    Currently on Adderall 10 mg twice daily.    ADHD Monitoring:  Taking med as directed:  daily  Duration of medicine effect does last through work day and completion of work.  Current issues with work performance: None  Behavior effects: Improvement in : attention to task, listening to instructions, completing assigned tasks, organizing work.  Denies bothersome side effects: appetite loss, insomnia, headaches, stomachaches, fatigue, staring, irritability, crying, motor/vocal tic, nervousness.     Riley (Generalized Anxiety Disorder)    She has history of anxiety symptoms, she recognizes these symptoms and knows what works for her for these symptoms.  She reports that exercise, and breathing exercises helps with anxiety symptoms.  Currently on Atarax as needed for symptoms.           ROS   Positive for ADHD symptoms without medication.  Positive for anxiety symptoms sometimes.  Positive for upper back pain sometimes.    Current medicines (including changes today)  Current Outpatient Medications   Medication Sig Dispense Refill   • [START ON 4/25/2022] amphetamine-dextroamphetamine (ADDERALL) 10 MG Tab Take 1 Tablet by mouth 2 times a day for 30 days. 60 Tablet 0   • [START ON 5/25/2022] amphetamine-dextroamphetamine  "(ADDERALL) 10 MG Tab Take 1 Tablet by mouth 2 times a day for 30 days. 60 Tablet 0   • [START ON 6/24/2022] amphetamine-dextroamphetamine (ADDERALL) 10 MG Tab Take 1 Tablet by mouth 2 times a day for 30 days. 60 Tablet 0   • hydrOXYzine HCl (ATARAX) 25 MG Tab Take 1 Tablet by mouth 3 times a day as needed for Anxiety. 30 Tablet 2     No current facility-administered medications for this visit.       Patient Active Problem List    Diagnosis Date Noted   • Muscle strain 04/15/2022   • Attention deficit hyperactivity disorder (ADHD), combined type 07/16/2021   • MARJORIE (generalized anxiety disorder) 07/16/2021        Objective:   Ht 1.702 m (5' 7\")   Wt 62.1 kg (137 lb)   BMI 21.46 kg/m²     Physical Exam:  Constitutional: Alert, no distress, well-groomed.  Skin: No rashes in visible areas.  Eye: Round. Conjunctiva clear, lids normal. No icterus.   ENMT: Lips pink without lesions, good dentition, moist mucous membranes. Phonation normal.  Neck: No masses, no thyromegaly. Moves freely without pain.  Respiratory: Unlabored respiratory effort, no cough or audible wheeze  Psych: Alert, normal affect and mood.     Assessment and Plan:   The following treatment plan was discussed:     Problem List Items Addressed This Visit     Attention deficit hyperactivity disorder (ADHD), combined type     Chronic health problem, stable on Adderall 10 mg twice daily.  Doing well with this medication without any side effects.  Check PDMP.  Medication sent for 3 months.  Urine drug screen was collected last time, but I never received results.  We will contact lab regarding urine drug screen results.         Relevant Medications    amphetamine-dextroamphetamine (ADDERALL) 10 MG Tab (Start on 4/25/2022)    amphetamine-dextroamphetamine (ADDERALL) 10 MG Tab (Start on 5/25/2022)    amphetamine-dextroamphetamine (ADDERALL) 10 MG Tab (Start on 6/24/2022)    MARJORIE (generalized anxiety disorder)     Chronic health problem, stable, continue Atarax as " needed.  Continue exercise, breathing exercises.         Muscle strain     Chronic health problem, discussed with patient about doing physical therapy.  She will work with massage therapist and if not getting better we can refer her to physical therapy.             Follow-up: Return in about 3 months (around 7/15/2022).

## 2022-04-15 NOTE — ASSESSMENT & PLAN NOTE
Chronic health problem, stable, continue Atarax as needed.  Continue exercise, breathing exercises.

## 2022-04-15 NOTE — ASSESSMENT & PLAN NOTE
Chronic health problem, stable on Adderall 10 mg twice daily.  Doing well with this medication without any side effects.  Check PDMP.  Medication sent for 3 months.  Urine drug screen was collected last time, but I never received results.  We will contact lab regarding urine drug screen results.

## 2022-04-15 NOTE — ASSESSMENT & PLAN NOTE
Chronic health problem, discussed with patient about doing physical therapy.  She will work with massage therapist and if not getting better we can refer her to physical therapy.

## 2022-05-04 ENCOUNTER — PATIENT MESSAGE (OUTPATIENT)
Dept: MEDICAL GROUP | Facility: MEDICAL CENTER | Age: 42
End: 2022-05-04
Payer: COMMERCIAL

## 2022-05-04 DIAGNOSIS — F90.2 ATTENTION DEFICIT HYPERACTIVITY DISORDER (ADHD), COMBINED TYPE: ICD-10-CM

## 2022-05-04 RX ORDER — DEXTROAMPHETAMINE SACCHARATE, AMPHETAMINE ASPARTATE, DEXTROAMPHETAMINE SULFATE AND AMPHETAMINE SULFATE 2.5; 2.5; 2.5; 2.5 MG/1; MG/1; MG/1; MG/1
10 TABLET ORAL 2 TIMES DAILY
Qty: 60 TABLET | Refills: 0 | Status: SHIPPED | OUTPATIENT
Start: 2022-05-04 | End: 2022-06-06 | Stop reason: SDUPTHER

## 2022-05-04 NOTE — PATIENT COMMUNICATION
I called [facility] pharmacy in [place] to cancel all three Adderall rx. They said it been cancelled.       Received request via: Patient    Was the patient seen in the last year in this department? Yes    Does the patient have an active prescription (recently filled or refills available) for medication(s) requested? No

## 2022-07-05 DIAGNOSIS — F90.2 ATTENTION DEFICIT HYPERACTIVITY DISORDER (ADHD), COMBINED TYPE: ICD-10-CM

## 2022-07-05 RX ORDER — DEXTROAMPHETAMINE SACCHARATE, AMPHETAMINE ASPARTATE, DEXTROAMPHETAMINE SULFATE AND AMPHETAMINE SULFATE 2.5; 2.5; 2.5; 2.5 MG/1; MG/1; MG/1; MG/1
10 TABLET ORAL 2 TIMES DAILY
Qty: 60 TABLET | Refills: 0 | Status: SHIPPED | OUTPATIENT
Start: 2022-07-06 | End: 2022-07-27

## 2022-07-06 ENCOUNTER — PATIENT MESSAGE (OUTPATIENT)
Dept: MEDICAL GROUP | Facility: MEDICAL CENTER | Age: 42
End: 2022-07-06
Payer: COMMERCIAL

## 2022-07-06 DIAGNOSIS — Z11.3 ROUTINE SCREENING FOR STI (SEXUALLY TRANSMITTED INFECTION): ICD-10-CM

## 2022-07-09 ENCOUNTER — HOSPITAL ENCOUNTER (OUTPATIENT)
Dept: LAB | Facility: MEDICAL CENTER | Age: 42
End: 2022-07-09
Attending: FAMILY MEDICINE
Payer: COMMERCIAL

## 2022-07-09 DIAGNOSIS — Z11.3 ROUTINE SCREENING FOR STI (SEXUALLY TRANSMITTED INFECTION): ICD-10-CM

## 2022-07-09 LAB
C TRACH DNA SPEC QL NAA+PROBE: NEGATIVE
HBV SURFACE AG SER QL: NORMAL
HCV AB SER QL: NORMAL
HIV 1+2 AB+HIV1 P24 AG SERPL QL IA: NORMAL
N GONORRHOEA DNA SPEC QL NAA+PROBE: NEGATIVE
SPECIMEN SOURCE: NORMAL

## 2022-07-09 PROCEDURE — 87340 HEPATITIS B SURFACE AG IA: CPT

## 2022-07-09 PROCEDURE — 87389 HIV-1 AG W/HIV-1&-2 AB AG IA: CPT

## 2022-07-09 PROCEDURE — 87591 N.GONORRHOEAE DNA AMP PROB: CPT

## 2022-07-09 PROCEDURE — 86694 HERPES SIMPLEX NES ANTBDY: CPT

## 2022-07-09 PROCEDURE — 86780 TREPONEMA PALLIDUM: CPT

## 2022-07-09 PROCEDURE — 36415 COLL VENOUS BLD VENIPUNCTURE: CPT

## 2022-07-09 PROCEDURE — 87491 CHLMYD TRACH DNA AMP PROBE: CPT

## 2022-07-09 PROCEDURE — 86803 HEPATITIS C AB TEST: CPT

## 2022-07-12 LAB
HSV1+2 IGG SER IA-ACNC: >22.4 IV
HSV1+2 IGM SER IA-ACNC: 0.41 IV

## 2022-07-13 LAB — T PALLIDUM AB SER QL AGGL: NON REACTIVE

## 2022-07-15 ENCOUNTER — TELEPHONE (OUTPATIENT)
Dept: MEDICAL GROUP | Facility: MEDICAL CENTER | Age: 42
End: 2022-07-15

## 2022-07-15 NOTE — TELEPHONE ENCOUNTER
Phone Number Called: [phone] (home)       Call outcome: Did not leave a detailed message. Requested patient to call back.    Message: Pt re paul appt via [employer] no need to call THR

## 2022-07-27 ENCOUNTER — TELEMEDICINE (OUTPATIENT)
Dept: MEDICAL GROUP | Facility: MEDICAL CENTER | Age: 42
End: 2022-07-27
Payer: COMMERCIAL

## 2022-07-27 VITALS — WEIGHT: 140 LBS | HEIGHT: 67 IN | BODY MASS INDEX: 21.97 KG/M2

## 2022-07-27 DIAGNOSIS — Z12.31 ENCOUNTER FOR SCREENING MAMMOGRAM FOR MALIGNANT NEOPLASM OF BREAST: ICD-10-CM

## 2022-07-27 DIAGNOSIS — L98.9 SKIN LESION: ICD-10-CM

## 2022-07-27 DIAGNOSIS — F90.2 ATTENTION DEFICIT HYPERACTIVITY DISORDER (ADHD), COMBINED TYPE: ICD-10-CM

## 2022-07-27 PROCEDURE — 99214 OFFICE O/P EST MOD 30 MIN: CPT | Performed by: FAMILY MEDICINE

## 2022-07-27 RX ORDER — DEXTROAMPHETAMINE SACCHARATE, AMPHETAMINE ASPARTATE, DEXTROAMPHETAMINE SULFATE AND AMPHETAMINE SULFATE 3.75; 3.75; 3.75; 3.75 MG/1; MG/1; MG/1; MG/1
15 TABLET ORAL 2 TIMES DAILY
Qty: 60 TABLET | Refills: 0 | Status: SHIPPED | OUTPATIENT
Start: 2022-08-04 | End: 2022-09-03

## 2022-07-27 RX ORDER — DEXTROAMPHETAMINE SACCHARATE, AMPHETAMINE ASPARTATE, DEXTROAMPHETAMINE SULFATE AND AMPHETAMINE SULFATE 3.75; 3.75; 3.75; 3.75 MG/1; MG/1; MG/1; MG/1
15 TABLET ORAL 2 TIMES DAILY
Qty: 60 TABLET | Refills: 0 | Status: SHIPPED | OUTPATIENT
Start: 2022-09-03 | End: 2022-10-03

## 2022-07-27 RX ORDER — DEXTROAMPHETAMINE SACCHARATE, AMPHETAMINE ASPARTATE, DEXTROAMPHETAMINE SULFATE AND AMPHETAMINE SULFATE 3.75; 3.75; 3.75; 3.75 MG/1; MG/1; MG/1; MG/1
15 TABLET ORAL 2 TIMES DAILY
Qty: 60 TABLET | Refills: 0 | Status: SHIPPED | OUTPATIENT
Start: 2022-10-03 | End: 2022-10-25 | Stop reason: SDUPTHER

## 2022-07-27 NOTE — ASSESSMENT & PLAN NOTE
Chronic health problem, uncontrolled currently as medication effect is not lasting throughout the day.  Up-to-date for urine drug screen.  Last UDS on 2022 consistent with prescription.  We will mail controlled prescription contract as this got .    We discussed about nonstimulant medications for ADHD including Strattera, and Wellbutrin.  Patient would like to review these medications and will let me know.  In the meantime we will increase her Adderall to 15 mg twice daily.  Checked PDMP.    Obtained and reviewed patient utilization report from [facility] pharmacy database on 2022 12:36 PM  prior to writing prescription for controlled substance II, III or IV per [place] bill . Based on assessment of the report,my physical exam if necessary and the patient's health problem, the prescription is medically necessary.

## 2022-07-27 NOTE — PROGRESS NOTES
Virtual Visit: Established Patient   This visit was conducted via Zoom using secure and encrypted videoconferencing technology.   The patient was in their home in the [facility].    The patient's identity was confirmed and verbal consent was obtained for this virtual visit.     Subjective:   CC:   Chief Complaint   Patient presents with   • Follow-Up   • Lab Results       Deb Kaye is a 42 y.o. female presenting for follow-up for medication and lab review.      Problem   Attention Deficit Hyperactivity Disorder (Adhd), Combined Type    Currently on Adderall 10 mg twice daily.    ADHD Monitoring:  Taking med as directed:  daily  Duration of medicine effect does not last through work day and completion of work.  Current issues with work performance: Yes as medication is not lasting throughout day.  Behavior effects: Improvement in : attention to task, listening to instructions, completing assigned tasks, organizing work with medication  Denies bothersome side effects: appetite loss, insomnia, headaches, stomachaches, fatigue, staring, irritability, crying, motor/vocal tic, nervousness.       She has skin lesion at her left side of pelvic area which she thinks may be a skin tag.  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  She has history of cold sores.  She has appointment with gynecology for further evaluation.    ROS   Positive for attention deficit symptoms  Positive for skin lesion    Current medicines (including changes today)  Current Outpatient Medications   Medication Sig Dispense Refill   • [START ON 8/4/2022] amphetamine-dextroamphetamine (ADDERALL, 15MG,) 15 MG tablet Take 1 Tablet by mouth 2 times a day for 30 days. 60 Tablet 0   • [START ON 9/3/2022] amphetamine-dextroamphetamine (ADDERALL, 15MG,) 15 MG tablet Take 1 Tablet by mouth 2 times a day for 30 days. 60 Tablet 0   • [START ON 10/3/2022] amphetamine-dextroamphetamine (ADDERALL, 15MG,) 15  "MG tablet Take 1 Tablet by mouth 2 times a day for 30 days. 60 Tablet 0   • hydrOXYzine HCl (ATARAX) 25 MG Tab Take 1 Tablet by mouth 3 times a day as needed for Anxiety. 30 Tablet 2     No current facility-administered medications for this visit.       Patient Active Problem List    Diagnosis Date Noted   • Muscle strain 04/15/2022   • Attention deficit hyperactivity disorder (ADHD), combined type 2021   • MARJORIE (generalized anxiety disorder) 2021        Objective:   Ht 1.702 m (5' 7\")   Wt 63.5 kg (140 lb)   BMI 21.93 kg/m²     Physical Exam:  Constitutional: Alert, no distress, well-groomed.  Skin: No rashes in visible areas.  Eye: Round. Conjunctiva clear, lids normal. No icterus.   ENMT: Lips pink without lesions, good dentition, moist mucous membranes. Phonation normal.  Neck: No masses, no thyromegaly. Moves freely without pain.  Respiratory: Unlabored respiratory effort, no cough or audible wheeze  Psych: Alert, normal affect and mood.     Assessment and Plan:   The following treatment plan was discussed:     Problem List Items Addressed This Visit     Attention deficit hyperactivity disorder (ADHD), combined type     Chronic health problem, uncontrolled currently as medication effect is not lasting throughout the day.  Up-to-date for urine drug screen.  Last UDS on 2022 consistent with prescription.  We will mail controlled prescription contract as this got .    We discussed about nonstimulant medications for ADHD including Strattera, and Wellbutrin.  Patient would like to review these medications and will let me know.  In the meantime we will increase her Adderall to 15 mg twice daily.  Checked PDMP.    Obtained and reviewed patient utilization report from [facility] pharmacy database on 2022 12:36 PM  prior to writing prescription for controlled substance II, III or IV per [place] bill . Based on assessment of the report,my physical exam if necessary and the patient's " health problem, the prescription is medically necessary.            Relevant Medications    amphetamine-dextroamphetamine (ADDERALL, 15MG,) 15 MG tablet (Start on 8/4/2022)    amphetamine-dextroamphetamine (ADDERALL, 15MG,) 15 MG tablet (Start on 9/3/2022)    amphetamine-dextroamphetamine (ADDERALL, 15MG,) 15 MG tablet (Start on 10/3/2022)    Other Relevant Orders    Controlled Substance Treatment Agreement      Other Visit Diagnoses     Encounter for screening mammogram for malignant neoplasm of breast        Relevant Orders    MA-SCREENING MAMMO BILAT W/TOMOSYNTHESIS W/CAD    Skin lesion  New health problem, recommended to follow-up with gynecology for further evaluation, could be skin tag versus wart.            Follow-up: Return in about 3 months (around 10/27/2022).

## 2022-08-15 ENCOUNTER — GYNECOLOGY VISIT (OUTPATIENT)
Dept: OBGYN | Facility: CLINIC | Age: 42
End: 2022-08-15
Payer: COMMERCIAL

## 2022-08-15 ENCOUNTER — HOSPITAL ENCOUNTER (OUTPATIENT)
Facility: MEDICAL CENTER | Age: 42
End: 2022-08-15
Attending: OBSTETRICS & GYNECOLOGY
Payer: COMMERCIAL

## 2022-08-15 VITALS — BODY MASS INDEX: 22.24 KG/M2 | WEIGHT: 142 LBS | DIASTOLIC BLOOD PRESSURE: 77 MMHG | SYSTOLIC BLOOD PRESSURE: 111 MMHG

## 2022-08-15 DIAGNOSIS — Z01.419 WOMEN'S ANNUAL ROUTINE GYNECOLOGICAL EXAMINATION: ICD-10-CM

## 2022-08-15 PROCEDURE — 87624 HPV HI-RISK TYP POOLED RSLT: CPT

## 2022-08-15 PROCEDURE — 88175 CYTOPATH C/V AUTO FLUID REDO: CPT

## 2022-08-15 PROCEDURE — 99396 PREV VISIT EST AGE 40-64: CPT | Performed by: OBSTETRICS & GYNECOLOGY

## 2022-08-15 NOTE — PROGRESS NOTES
Annual examination;  This patient is a 42 y.o. female  using BTL for birth control.  History of ASCUS with colposcopy last year    Last mammogram-no baseline    /77 (BP Location: Right arm, Patient Position: Sitting, BP Cuff Size: Small adult)   Wt 64.4 kg (142 lb)   LMP 2022 (Approximate)   BMI 22.24 kg/m²     Physical examination;  Breast examination- No dominant masses, No skin retraction, No axillary adenopathy-bilateral breast implants intact    Pelvic examination;  External genitalia-No visible lesions, urethra normal in appearance, Merriman's glands normal  Vagina-No blood or discharge, bladder normal in position without gross lesions  Cervix-No gross lesions, Pap smear taken  Uterus-Normal size and shape,  No tenderness  Adnexa No mass or tenderness    Abdomen-nondistended positive bowel sounds soft nontender without masses or hepatosplenomegaly    Impression;  Normal annual    Plan;  BTL for BC   Check PAP  Form given for mammogram    Female chaperone present during entire history and physical examination

## 2022-08-16 LAB
CYTOLOGY REG CYTOL: ABNORMAL
HPV HR 12 DNA CVX QL NAA+PROBE: POSITIVE
HPV16 DNA SPEC QL NAA+PROBE: NEGATIVE
HPV18 DNA SPEC QL NAA+PROBE: NEGATIVE
SPECIMEN SOURCE: ABNORMAL

## 2022-08-24 DIAGNOSIS — B96.89 BACTERIAL VAGINOSIS: ICD-10-CM

## 2022-08-24 DIAGNOSIS — N76.0 BACTERIAL VAGINOSIS: ICD-10-CM

## 2022-08-24 RX ORDER — METRONIDAZOLE 500 MG/1
500 TABLET ORAL 2 TIMES DAILY
Qty: 14 TABLET | Refills: 0 | Status: SHIPPED | OUTPATIENT
Start: 2022-08-24 | End: 2022-08-31

## 2022-08-30 ENCOUNTER — HOSPITAL ENCOUNTER (OUTPATIENT)
Dept: RADIOLOGY | Facility: MEDICAL CENTER | Age: 42
End: 2022-08-30
Payer: COMMERCIAL

## 2022-09-08 ENCOUNTER — APPOINTMENT (OUTPATIENT)
Dept: RADIOLOGY | Facility: MEDICAL CENTER | Age: 42
End: 2022-09-08
Attending: OBSTETRICS & GYNECOLOGY
Payer: COMMERCIAL

## 2022-09-29 ENCOUNTER — APPOINTMENT (OUTPATIENT)
Dept: RADIOLOGY | Facility: MEDICAL CENTER | Age: 42
End: 2022-09-29
Attending: OBSTETRICS & GYNECOLOGY
Payer: COMMERCIAL

## 2022-10-11 ENCOUNTER — PATIENT MESSAGE (OUTPATIENT)
Dept: MEDICAL GROUP | Facility: MEDICAL CENTER | Age: 42
End: 2022-10-11
Payer: COMMERCIAL

## 2022-10-11 ENCOUNTER — HOSPITAL ENCOUNTER (OUTPATIENT)
Facility: MEDICAL CENTER | Age: 42
End: 2022-10-11
Attending: FAMILY MEDICINE
Payer: COMMERCIAL

## 2022-10-11 ENCOUNTER — OFFICE VISIT (OUTPATIENT)
Dept: MEDICAL GROUP | Facility: MEDICAL CENTER | Age: 42
End: 2022-10-11
Payer: COMMERCIAL

## 2022-10-11 VITALS
HEART RATE: 72 BPM | RESPIRATION RATE: 16 BRPM | SYSTOLIC BLOOD PRESSURE: 100 MMHG | WEIGHT: 138.89 LBS | DIASTOLIC BLOOD PRESSURE: 60 MMHG | BODY MASS INDEX: 21.8 KG/M2 | TEMPERATURE: 98.2 F | OXYGEN SATURATION: 99 % | HEIGHT: 67 IN

## 2022-10-11 DIAGNOSIS — N39.0 RECURRENT UTI: ICD-10-CM

## 2022-10-11 DIAGNOSIS — R30.0 DYSURIA: ICD-10-CM

## 2022-10-11 LAB
APPEARANCE UR: CLEAR
BILIRUB UR STRIP-MCNC: NORMAL MG/DL
COLOR UR AUTO: YELLOW
GLUCOSE UR STRIP.AUTO-MCNC: NORMAL MG/DL
KETONES UR STRIP.AUTO-MCNC: NORMAL MG/DL
LEUKOCYTE ESTERASE UR QL STRIP.AUTO: NORMAL
NITRITE UR QL STRIP.AUTO: NORMAL
PH UR STRIP.AUTO: 7 [PH] (ref 5–8)
PROT UR QL STRIP: NORMAL MG/DL
RBC UR QL AUTO: NORMAL
SP GR UR STRIP.AUTO: 1.01
UROBILINOGEN UR STRIP-MCNC: 0.2 MG/DL

## 2022-10-11 PROCEDURE — 81002 URINALYSIS NONAUTO W/O SCOPE: CPT | Performed by: FAMILY MEDICINE

## 2022-10-11 PROCEDURE — 99214 OFFICE O/P EST MOD 30 MIN: CPT | Performed by: FAMILY MEDICINE

## 2022-10-11 PROCEDURE — 87186 SC STD MICRODIL/AGAR DIL: CPT

## 2022-10-11 PROCEDURE — 87086 URINE CULTURE/COLONY COUNT: CPT

## 2022-10-11 PROCEDURE — 87077 CULTURE AEROBIC IDENTIFY: CPT

## 2022-10-11 RX ORDER — NITROFURANTOIN 25; 75 MG/1; MG/1
100 CAPSULE ORAL 2 TIMES DAILY
Qty: 10 CAPSULE | Refills: 0 | Status: SHIPPED | OUTPATIENT
Start: 2022-10-11 | End: 2022-10-16

## 2022-10-11 RX ORDER — SULFAMETHOXAZOLE AND TRIMETHOPRIM 800; 160 MG/1; MG/1
TABLET ORAL
Qty: 30 TABLET | Refills: 2 | Status: SHIPPED | OUTPATIENT
Start: 2022-10-11

## 2022-10-11 ASSESSMENT — ENCOUNTER SYMPTOMS
FEVER: 0
CHILLS: 0

## 2022-10-11 ASSESSMENT — PATIENT HEALTH QUESTIONNAIRE - PHQ9: CLINICAL INTERPRETATION OF PHQ2 SCORE: 0

## 2022-10-12 DIAGNOSIS — R30.0 DYSURIA: ICD-10-CM

## 2022-10-12 NOTE — PROGRESS NOTES
"FAMILY MEDICINE VISIT                                                               Chief complaint::Diagnoses of Dysuria and Recurrent UTI were pertinent to this visit.    History of present illness: Deb Kaye is a 42 y.o. female who presented for burning urination, recurrent UTIs.    She is experiencing burning urination, frequency, urgency to go, pelvic discomfort since last few days.  She has been taking over-the-counter medications phenazopyridine.  She is sexually active with new partner.  She has history of recurrent UTIs and was previously on postcoital prophylaxis      Review of systems:     Review of Systems   Constitutional:  Negative for chills and fever.   Genitourinary:  Positive for dysuria, frequency and urgency.      Medications and Allergies:     Current Outpatient Medications   Medication Sig Dispense Refill    nitrofurantoin (MACROBID) 100 MG Cap Take 1 Capsule by mouth 2 times a day for 5 days. 10 Capsule 0    sulfamethoxazole-trimethoprim (BACTRIM DS) 800-160 MG tablet One-half to 1 tablet by mouth as a single dose immediately before or after sexual intercourse 30 Tablet 2    amphetamine-dextroamphetamine (ADDERALL, 15MG,) 15 MG tablet Take 1 Tablet by mouth 2 times a day for 30 days. 60 Tablet 0    hydrOXYzine HCl (ATARAX) 25 MG Tab Take 1 Tablet by mouth 3 times a day as needed for Anxiety. 30 Tablet 2     No current facility-administered medications for this visit.          Vitals:    /60 (BP Location: Left arm, Patient Position: Sitting, BP Cuff Size: Adult)   Pulse 72   Temp 36.8 °C (98.2 °F)   Resp 16   Ht 1.702 m (5' 7\")   Wt 63 kg (138 lb 14.2 oz)   SpO2 99%  Body mass index is 21.75 kg/m².    Physical Exam:     Physical Exam  Constitutional:       General: She is not in acute distress.  HENT:      Head: Normocephalic and atraumatic.      Right Ear: External ear normal.      Left Ear: External ear normal.   Eyes:      Conjunctiva/sclera: Conjunctivae normal. "   Cardiovascular:      Rate and Rhythm: Normal rate.   Pulmonary:      Effort: Pulmonary effort is normal. No respiratory distress.   Musculoskeletal:         General: No deformity.      Cervical back: Neck supple.   Skin:     Findings: No rash.   Neurological:      Mental Status: She is alert.      Gait: Gait is intact.   Psychiatric:         Mood and Affect: Mood and affect normal.         Judgment: Judgment normal.        Assessment/Plan:      1. Dysuria  New problem, point-of-care urinalysis showed moderate leukocytes, negative nitrites.  Start Macrobid 1 tablet 2 times daily.  Send urine for culture.    - POCT Urinalysis  - nitrofurantoin (MACROBID) 100 MG Cap; Take 1 Capsule by mouth 2 times a day for 5 days.  Dispense: 10 Capsule; Refill: 0  - URINE CULTURE(NEW); Future    2. Recurrent UTI  Chronic problem, start Bactrim for postcoital prophylaxis.    - sulfamethoxazole-trimethoprim (BACTRIM DS) 800-160 MG tablet; One-half to 1 tablet by mouth as a single dose immediately before or after sexual intercourse  Dispense: 30 Tablet; Refill: 2         Please note that this dictation was created using voice recognition software. I have made every reasonable attempt to correct obvious errors, but I expect that there are errors of grammar and possibly content that I did not discover before finalizing the note.    Follow up as needed if symptoms are not getting better, on 10/25/2022 for medication follow-up

## 2022-10-14 LAB
BACTERIA UR CULT: ABNORMAL
BACTERIA UR CULT: ABNORMAL
SIGNIFICANT IND 70042: ABNORMAL
SITE SITE: ABNORMAL
SOURCE SOURCE: ABNORMAL

## 2022-10-18 ENCOUNTER — HOSPITAL ENCOUNTER (OUTPATIENT)
Dept: RADIOLOGY | Facility: MEDICAL CENTER | Age: 42
End: 2022-10-18
Attending: OBSTETRICS & GYNECOLOGY
Payer: COMMERCIAL

## 2022-10-18 DIAGNOSIS — Z01.419 WOMEN'S ANNUAL ROUTINE GYNECOLOGICAL EXAMINATION: ICD-10-CM

## 2022-10-18 PROCEDURE — 77063 BREAST TOMOSYNTHESIS BI: CPT

## 2022-10-25 ENCOUNTER — TELEMEDICINE (OUTPATIENT)
Dept: MEDICAL GROUP | Facility: MEDICAL CENTER | Age: 42
End: 2022-10-25
Payer: COMMERCIAL

## 2022-10-25 VITALS — HEIGHT: 67 IN | BODY MASS INDEX: 21.66 KG/M2 | WEIGHT: 138 LBS

## 2022-10-25 DIAGNOSIS — Z13.1 SCREENING FOR DIABETES MELLITUS: ICD-10-CM

## 2022-10-25 DIAGNOSIS — N30.00 ACUTE CYSTITIS WITHOUT HEMATURIA: ICD-10-CM

## 2022-10-25 DIAGNOSIS — F90.2 ATTENTION DEFICIT HYPERACTIVITY DISORDER (ADHD), COMBINED TYPE: ICD-10-CM

## 2022-10-25 DIAGNOSIS — Z13.220 SCREENING FOR LIPID DISORDERS: ICD-10-CM

## 2022-10-25 PROCEDURE — 99214 OFFICE O/P EST MOD 30 MIN: CPT | Performed by: FAMILY MEDICINE

## 2022-10-25 RX ORDER — DEXTROAMPHETAMINE SACCHARATE, AMPHETAMINE ASPARTATE, DEXTROAMPHETAMINE SULFATE AND AMPHETAMINE SULFATE 3.75; 3.75; 3.75; 3.75 MG/1; MG/1; MG/1; MG/1
15 TABLET ORAL 2 TIMES DAILY
Qty: 60 TABLET | Refills: 0 | Status: SHIPPED | OUTPATIENT
Start: 2023-01-01 | End: 2023-01-27 | Stop reason: SDUPTHER

## 2022-10-25 RX ORDER — DEXTROAMPHETAMINE SACCHARATE, AMPHETAMINE ASPARTATE, DEXTROAMPHETAMINE SULFATE AND AMPHETAMINE SULFATE 3.75; 3.75; 3.75; 3.75 MG/1; MG/1; MG/1; MG/1
15 TABLET ORAL 2 TIMES DAILY
Qty: 60 TABLET | Refills: 0 | Status: SHIPPED | OUTPATIENT
Start: 2022-11-02 | End: 2022-12-02

## 2022-10-25 RX ORDER — DEXTROAMPHETAMINE SACCHARATE, AMPHETAMINE ASPARTATE, DEXTROAMPHETAMINE SULFATE AND AMPHETAMINE SULFATE 3.75; 3.75; 3.75; 3.75 MG/1; MG/1; MG/1; MG/1
15 TABLET ORAL 2 TIMES DAILY
Qty: 60 TABLET | Refills: 0 | Status: SHIPPED | OUTPATIENT
Start: 2022-12-02 | End: 2023-01-01

## 2022-10-25 RX ORDER — NITROFURANTOIN 25; 75 MG/1; MG/1
100 CAPSULE ORAL 2 TIMES DAILY
Qty: 10 CAPSULE | Refills: 0 | Status: SHIPPED | OUTPATIENT
Start: 2022-10-25 | End: 2022-10-30

## 2022-10-25 NOTE — ASSESSMENT & PLAN NOTE
Chronic problem, stable, continue same medication regimen.  Checked PDMP.  Medication sent for 3 months.  We will do a urine drug screen at next visit.    Obtained and reviewed patient utilization report from [facility] pharmacy database on 10/25/2022 12:15 PM  prior to writing prescription for controlled substance II, III or IV per [place] bill . Based on assessment of the report,my physical exam if necessary and the patient's health problem, the prescription is medically necessary.

## 2022-10-25 NOTE — PROGRESS NOTES
Virtual Visit: Established Patient   This visit was conducted via Zoom using secure and encrypted videoconferencing technology.   The patient was in their home in the [facility].    The patient's identity was confirmed and verbal consent was obtained for this virtual visit.     Subjective:   CC:   Chief Complaint   Patient presents with    Follow-Up       Deb Kaye is a 42 y.o. female presenting for follow-up for medication.    Problem   Acute Cystitis Without Hematuria    Recent urine culture showed E. coli.  She was treated with Macrobid 2 times daily.  She reports that she still has some residual symptoms.  She is on Bactrim for prophylaxis for recurrent UTIs.  This urine culture showed that bacteria is resistant to Bactrim.     Attention Deficit Hyperactivity Disorder (Adhd), Combined Type    Currently on Adderall 10 mg twice daily.  No side effects with medication.  Controlled substance treatment agreement signed in chart.  Previous evaluation also in media section.  She is up-to-date for urine drug screen currently, will be due in January 2023.          ROS   Positive for urinary problems.    Current medicines (including changes today)  Current Outpatient Medications   Medication Sig Dispense Refill    nitrofurantoin (MACROBID) 100 MG Cap Take 1 Capsule by mouth 2 times a day for 5 days. 10 Capsule 0    [START ON 11/2/2022] amphetamine-dextroamphetamine (ADDERALL, 15MG,) 15 MG tablet Take 1 Tablet by mouth 2 times a day for 30 days. 60 Tablet 0    [START ON 12/2/2022] amphetamine-dextroamphetamine (ADDERALL) 15 MG tablet Take 1 Tablet by mouth 2 times a day for 30 days. 60 Tablet 0    [START ON 1/1/2023] amphetamine-dextroamphetamine (ADDERALL) 15 MG tablet Take 1 Tablet by mouth 2 times a day for 30 days. 60 Tablet 0    sulfamethoxazole-trimethoprim (BACTRIM DS) 800-160 MG tablet One-half to 1 tablet by mouth as a single dose immediately before or after sexual intercourse 30 Tablet 2     "hydrOXYzine HCl (ATARAX) 25 MG Tab Take 1 Tablet by mouth 3 times a day as needed for Anxiety. 30 Tablet 2     No current facility-administered medications for this visit.       Patient Active Problem List    Diagnosis Date Noted    Acute cystitis without hematuria 10/25/2022    Muscle strain 04/15/2022    Attention deficit hyperactivity disorder (ADHD), combined type 07/16/2021    MARJORIE (generalized anxiety disorder) 07/16/2021        Objective:   Ht 1.702 m (5' 7\")   Wt 62.6 kg (138 lb)   BMI 21.61 kg/m²     Physical Exam:  Constitutional: Alert, no distress, well-groomed.  Skin: No rashes in visible areas.  Eye: Round. Conjunctiva clear, lids normal. No icterus.   ENMT: Lips pink without lesions, good dentition, moist mucous membranes. Phonation normal.  Neck: No masses, no thyromegaly. Moves freely without pain.  Respiratory: Unlabored respiratory effort, no cough or audible wheeze  Psych: Alert, normal affect and mood.     Assessment and Plan:   The following treatment plan was discussed:     Problem List Items Addressed This Visit       Acute cystitis without hematuria     Acute on chronic, restart Macrobid 100 mg 2 times daily for 5 days.  Discussed if symptoms are not getting better then we will treat with ciprofloxacin 1 tablet 2 times daily for 5 days.  Continue Bactrim currently for prophylaxis.  If she keeps getting recurrent UTIs, we will have to switch her prophylaxis to another antibiotic.         Relevant Medications    nitrofurantoin (MACROBID) 100 MG Cap    Attention deficit hyperactivity disorder (ADHD), combined type     Chronic problem, stable, continue same medication regimen.  Checked PDMP.  Medication sent for 3 months.  We will do a urine drug screen at next visit.    Obtained and reviewed patient utilization report from [facility] pharmacy database on 10/25/2022 12:15 PM  prior to writing prescription for controlled substance II, III or IV per [place] bill . Based on assessment of the " report,my physical exam if necessary and the patient's health problem, the prescription is medically necessary.          Relevant Medications    amphetamine-dextroamphetamine (ADDERALL, 15MG,) 15 MG tablet (Start on 11/2/2022)    amphetamine-dextroamphetamine (ADDERALL) 15 MG tablet (Start on 12/2/2022)    amphetamine-dextroamphetamine (ADDERALL) 15 MG tablet (Start on 1/1/2023)     Other Visit Diagnoses       Screening for lipid disorders        Relevant Orders    Lipid Profile    Screening for diabetes mellitus        Relevant Orders    Comp Metabolic Panel               Follow-up: Return in about 3 months (around 1/25/2023) for MEDICATION FOLLOW UP.

## 2022-10-25 NOTE — ASSESSMENT & PLAN NOTE
Acute on chronic, restart Macrobid 100 mg 2 times daily for 5 days.  Discussed if symptoms are not getting better then we will treat with ciprofloxacin 1 tablet 2 times daily for 5 days.  Continue Bactrim currently for prophylaxis.  If she keeps getting recurrent UTIs, we will have to switch her prophylaxis to another antibiotic.

## 2022-12-05 ENCOUNTER — HOSPITAL ENCOUNTER (OUTPATIENT)
Dept: LAB | Facility: MEDICAL CENTER | Age: 42
End: 2022-12-05
Attending: FAMILY MEDICINE
Payer: COMMERCIAL

## 2022-12-05 DIAGNOSIS — R30.0 DYSURIA: ICD-10-CM

## 2022-12-05 LAB
APPEARANCE UR: CLEAR
BACTERIA #/AREA URNS HPF: ABNORMAL /HPF
BILIRUB UR QL STRIP.AUTO: NEGATIVE
COLOR UR: YELLOW
EPI CELLS #/AREA URNS HPF: ABNORMAL /HPF
GLUCOSE UR STRIP.AUTO-MCNC: 100 MG/DL
HYALINE CASTS #/AREA URNS LPF: ABNORMAL /LPF
KETONES UR STRIP.AUTO-MCNC: NEGATIVE MG/DL
LEUKOCYTE ESTERASE UR QL STRIP.AUTO: ABNORMAL
MICRO URNS: ABNORMAL
NITRITE UR QL STRIP.AUTO: POSITIVE
PH UR STRIP.AUTO: 6.5 [PH] (ref 5–8)
PROT UR QL STRIP: NEGATIVE MG/DL
RBC # URNS HPF: ABNORMAL /HPF
RBC UR QL AUTO: ABNORMAL
RENAL EPI CELLS #/AREA URNS HPF: ABNORMAL /HPF
SP GR UR STRIP.AUTO: 1.01
UROBILINOGEN UR STRIP.AUTO-MCNC: 1 MG/DL
WBC #/AREA URNS HPF: ABNORMAL /HPF

## 2022-12-05 PROCEDURE — 87077 CULTURE AEROBIC IDENTIFY: CPT

## 2022-12-05 PROCEDURE — 87186 SC STD MICRODIL/AGAR DIL: CPT

## 2022-12-05 PROCEDURE — 81001 URINALYSIS AUTO W/SCOPE: CPT

## 2022-12-05 PROCEDURE — 87086 URINE CULTURE/COLONY COUNT: CPT

## 2023-01-24 ENCOUNTER — HOSPITAL ENCOUNTER (OUTPATIENT)
Dept: LAB | Facility: MEDICAL CENTER | Age: 43
End: 2023-01-24
Attending: FAMILY MEDICINE
Payer: COMMERCIAL

## 2023-01-24 DIAGNOSIS — Z13.220 SCREENING FOR LIPID DISORDERS: ICD-10-CM

## 2023-01-24 DIAGNOSIS — Z13.1 SCREENING FOR DIABETES MELLITUS: ICD-10-CM

## 2023-01-24 LAB
ALBUMIN SERPL BCP-MCNC: 4.8 G/DL (ref 3.2–4.9)
ALBUMIN/GLOB SERPL: 2.3 G/DL
ALP SERPL-CCNC: 55 U/L (ref 30–99)
ALT SERPL-CCNC: 17 U/L (ref 2–50)
ANION GAP SERPL CALC-SCNC: 9 MMOL/L (ref 7–16)
AST SERPL-CCNC: 19 U/L (ref 12–45)
BILIRUB SERPL-MCNC: 0.2 MG/DL (ref 0.1–1.5)
BUN SERPL-MCNC: 12 MG/DL (ref 8–22)
CALCIUM ALBUM COR SERPL-MCNC: 8.6 MG/DL (ref 8.5–10.5)
CALCIUM SERPL-MCNC: 9.2 MG/DL (ref 8.5–10.5)
CHLORIDE SERPL-SCNC: 105 MMOL/L (ref 96–112)
CHOLEST SERPL-MCNC: 214 MG/DL (ref 100–199)
CO2 SERPL-SCNC: 29 MMOL/L (ref 20–33)
CREAT SERPL-MCNC: 0.89 MG/DL (ref 0.5–1.4)
FASTING STATUS PATIENT QL REPORTED: NORMAL
GFR SERPLBLD CREATININE-BSD FMLA CKD-EPI: 83 ML/MIN/1.73 M 2
GLOBULIN SER CALC-MCNC: 2.1 G/DL (ref 1.9–3.5)
GLUCOSE SERPL-MCNC: 88 MG/DL (ref 65–99)
HDLC SERPL-MCNC: 63 MG/DL
LDLC SERPL CALC-MCNC: 133 MG/DL
POTASSIUM SERPL-SCNC: 4.2 MMOL/L (ref 3.6–5.5)
PROT SERPL-MCNC: 6.9 G/DL (ref 6–8.2)
SODIUM SERPL-SCNC: 143 MMOL/L (ref 135–145)
TRIGL SERPL-MCNC: 91 MG/DL (ref 0–149)

## 2023-01-24 PROCEDURE — 80053 COMPREHEN METABOLIC PANEL: CPT

## 2023-01-24 PROCEDURE — 80061 LIPID PANEL: CPT

## 2023-01-24 PROCEDURE — 36415 COLL VENOUS BLD VENIPUNCTURE: CPT

## 2023-01-27 ENCOUNTER — HOSPITAL ENCOUNTER (OUTPATIENT)
Facility: MEDICAL CENTER | Age: 43
End: 2023-01-27
Attending: FAMILY MEDICINE
Payer: COMMERCIAL

## 2023-01-27 ENCOUNTER — OFFICE VISIT (OUTPATIENT)
Dept: MEDICAL GROUP | Facility: MEDICAL CENTER | Age: 43
End: 2023-01-27
Payer: COMMERCIAL

## 2023-01-27 VITALS
RESPIRATION RATE: 16 BRPM | HEIGHT: 67 IN | WEIGHT: 143.3 LBS | TEMPERATURE: 96.8 F | DIASTOLIC BLOOD PRESSURE: 60 MMHG | HEART RATE: 75 BPM | SYSTOLIC BLOOD PRESSURE: 108 MMHG | OXYGEN SATURATION: 99 % | BODY MASS INDEX: 22.49 KG/M2

## 2023-01-27 DIAGNOSIS — K59.04 CHRONIC IDIOPATHIC CONSTIPATION: ICD-10-CM

## 2023-01-27 DIAGNOSIS — E78.5 DYSLIPIDEMIA: ICD-10-CM

## 2023-01-27 DIAGNOSIS — F90.2 ATTENTION DEFICIT HYPERACTIVITY DISORDER (ADHD), COMBINED TYPE: ICD-10-CM

## 2023-01-27 DIAGNOSIS — Z51.81 MEDICATION MONITORING ENCOUNTER: ICD-10-CM

## 2023-01-27 DIAGNOSIS — Z01.84 IMMUNITY STATUS TESTING: ICD-10-CM

## 2023-01-27 PROCEDURE — 99214 OFFICE O/P EST MOD 30 MIN: CPT | Performed by: FAMILY MEDICINE

## 2023-01-27 PROCEDURE — G0480 DRUG TEST DEF 1-7 CLASSES: HCPCS

## 2023-01-27 PROCEDURE — 80307 DRUG TEST PRSMV CHEM ANLYZR: CPT

## 2023-01-27 RX ORDER — DEXTROAMPHETAMINE SACCHARATE, AMPHETAMINE ASPARTATE, DEXTROAMPHETAMINE SULFATE AND AMPHETAMINE SULFATE 3.75; 3.75; 3.75; 3.75 MG/1; MG/1; MG/1; MG/1
15 TABLET ORAL 2 TIMES DAILY
Qty: 60 TABLET | Refills: 0 | Status: SHIPPED | OUTPATIENT
Start: 2023-02-04 | End: 2023-03-06

## 2023-01-27 RX ORDER — DEXTROAMPHETAMINE SACCHARATE, AMPHETAMINE ASPARTATE, DEXTROAMPHETAMINE SULFATE AND AMPHETAMINE SULFATE 3.75; 3.75; 3.75; 3.75 MG/1; MG/1; MG/1; MG/1
15 TABLET ORAL 2 TIMES DAILY
Qty: 60 TABLET | Refills: 0 | Status: SHIPPED | OUTPATIENT
Start: 2023-03-06 | End: 2023-04-05

## 2023-01-27 RX ORDER — DEXTROAMPHETAMINE SACCHARATE, AMPHETAMINE ASPARTATE, DEXTROAMPHETAMINE SULFATE AND AMPHETAMINE SULFATE 3.75; 3.75; 3.75; 3.75 MG/1; MG/1; MG/1; MG/1
15 TABLET ORAL 2 TIMES DAILY
Qty: 60 TABLET | Refills: 0 | Status: SHIPPED | OUTPATIENT
Start: 2023-04-05 | End: 2023-04-27 | Stop reason: SDUPTHER

## 2023-01-27 ASSESSMENT — ENCOUNTER SYMPTOMS
CHILLS: 0
PALPITATIONS: 0
FEVER: 0
CONSTIPATION: 1
VOMITING: 0
BLOOD IN STOOL: 0
ABDOMINAL PAIN: 0
NAUSEA: 0

## 2023-01-27 ASSESSMENT — PATIENT HEALTH QUESTIONNAIRE - PHQ9: CLINICAL INTERPRETATION OF PHQ2 SCORE: 0

## 2023-01-27 NOTE — ASSESSMENT & PLAN NOTE
Chronic problem, unstable, start Linzess 145 mg daily.  Discussed side effects are diarrhea symptoms and if she experienced diarrhea symptoms she can take half tablet daily.  If not getting better, will refer to gastroenterology

## 2023-01-27 NOTE — ASSESSMENT & PLAN NOTE
Chronic problem, unstable based on recent results, recommended to cut back on cheese, recheck labs in 6-month.  Check lipoprotein a levels also.

## 2023-01-27 NOTE — PROGRESS NOTES
FAMILY MEDICINE VISIT                                                               Chief complaint::Diagnoses of Attention deficit hyperactivity disorder (ADHD), combined type, Chronic idiopathic constipation, Medication monitoring encounter, Dyslipidemia, and Immunity status testing were pertinent to this visit.    History of present illness: Deb Kaye is a 42 y.o. female who presented for med refill, lab follow-up, constipation.    Currently following with urology for recurrent UTIs, recently started on d-mannose and she is going to have bladder scan    Problem   Chronic Idiopathic Constipation    She has history of constipation for a long time.  She reports that constipation symptoms get more worse when she is in stress . At one time, she did not had bowel movement for 90 days and was seen by gastroenterology after that.  She takes fiber as needed for these symptoms.  Has not tried any other prescription medications.  She gets bowel movement 2 times in a week.     Dyslipidemia    Recent lipid panel showed elevated total cholesterol and LDL level.  She reports that she eats mostly healthy diet, eats cheese daily.  She is physically active.    Lab Results   Component Value Date/Time    CHOLSTRLTOT 214 (H) 01/24/2023 0648    TRIGLYCERIDE 91 01/24/2023 0648    HDL 63 01/24/2023 0648     (H) 01/24/2023 0648        Attention Deficit Hyperactivity Disorder (Adhd), Combined Type    Currently on Adderall 15 mg twice daily.  No side effects with medication.  Controlled substance treatment agreement signed in chart.  Previous evaluation also in media section.  She is due for urine drug screen.  Symptoms are controlled with this medication        Review of systems:     Review of Systems   Constitutional:  Negative for chills and fever.   Cardiovascular:  Negative for chest pain, palpitations and leg swelling.   Gastrointestinal:  Positive for constipation. Negative for abdominal pain, blood in stool, nausea  "and vomiting.   Psychiatric/Behavioral:          Attention deficit without medication      Medications and Allergies:     Current Outpatient Medications   Medication Sig Dispense Refill    linaCLOtide 145 MCG Cap Take 1 Tablet by mouth every day. 30 Capsule 1    [START ON 2/4/2023] amphetamine-dextroamphetamine (ADDERALL) 15 MG tablet Take 1 Tablet by mouth 2 times a day for 30 days. 60 Tablet 0    [START ON 3/6/2023] amphetamine-dextroamphetamine (ADDERALL) 15 MG tablet Take 1 Tablet by mouth 2 times a day for 30 days. 60 Tablet 0    [START ON 4/5/2023] amphetamine-dextroamphetamine (ADDERALL) 15 MG tablet Take 1 Tablet by mouth 2 times a day for 30 days. 60 Tablet 0    sulfamethoxazole-trimethoprim (BACTRIM DS) 800-160 MG tablet One-half to 1 tablet by mouth as a single dose immediately before or after sexual intercourse 30 Tablet 2    hydrOXYzine HCl (ATARAX) 25 MG Tab Take 1 Tablet by mouth 3 times a day as needed for Anxiety. 30 Tablet 2     No current facility-administered medications for this visit.          Vitals:    /60 (BP Location: Left arm, Patient Position: Sitting, BP Cuff Size: Adult)   Pulse 75   Temp 36 °C (96.8 °F)   Resp 16   Ht 1.702 m (5' 7\")   Wt 65 kg (143 lb 4.8 oz)   SpO2 99%  Body mass index is 22.44 kg/m².    Physical Exam:     Physical Exam  Constitutional:       Appearance: Normal appearance. She is well-developed and well-groomed.   HENT:      Head: Normocephalic and atraumatic.      Right Ear: External ear normal.      Left Ear: External ear normal.   Eyes:      Conjunctiva/sclera: Conjunctivae normal.   Cardiovascular:      Rate and Rhythm: Normal rate.   Pulmonary:      Effort: Pulmonary effort is normal. No respiratory distress.   Musculoskeletal:         General: No deformity.      Cervical back: Neck supple.   Skin:     Findings: No rash.   Neurological:      Mental Status: She is alert.      Gait: Gait is intact.   Psychiatric:         Mood and Affect: Mood and " affect normal.         Judgment: Judgment normal.        Labs:  I reviewed with patient recent labs resulted on 1/24/2023    Assessment/Plan:         Problem List Items Addressed This Visit       Dyslipidemia     Chronic problem, unstable based on recent results, recommended to cut back on cheese, recheck labs in 6-month.  Check lipoprotein a levels also.         Relevant Orders    Lipid Profile    Lipoprotein (a)    Chronic idiopathic constipation     Chronic problem, unstable, start Linzess 145 mg daily.  Discussed side effects are diarrhea symptoms and if she experienced diarrhea symptoms she can take half tablet daily.  If not getting better, will refer to gastroenterology         Relevant Medications    linaCLOtide 145 MCG Cap    Attention deficit hyperactivity disorder (ADHD), combined type     Chronic problem, stable, continue Adderall 15 mg 2 times daily, no side effects with medication.  Checked PDMP.  Medication sent for 3 months.  Urine drug screen sample collected today.    Obtained and reviewed patient utilization report from [facility] [place] pharmacy database on 1/27/2023 12:54 PM  prior to writing prescription for controlled substance II, III or IV per [place] bill . Based on assessment of the report,my physical exam if necessary and the patient's health problem, the prescription is medically necessary.          Relevant Medications    amphetamine-dextroamphetamine (ADDERALL) 15 MG tablet (Start on 2/4/2023)    amphetamine-dextroamphetamine (ADDERALL) 15 MG tablet (Start on 3/6/2023)    amphetamine-dextroamphetamine (ADDERALL) 15 MG tablet (Start on 4/5/2023)    Other Relevant Orders    URINE DRUG SCREEN W/CONF (AR)     Other Visit Diagnoses       Medication monitoring encounter        Relevant Orders    URINE DRUG SCREEN W/CONF (AR)    Immunity status testing        Relevant Orders    HEP B SURFACE AB             Please note that this dictation was created using voice recognition software. I have  made every reasonable attempt to correct obvious errors, but I expect that there are errors of grammar and possibly content that I did not discover before finalizing the note.    Follow up in 3 months for medication follow-up, 6 months for lab follow-up

## 2023-01-27 NOTE — ASSESSMENT & PLAN NOTE
Chronic problem, stable, continue Adderall 15 mg 2 times daily, no side effects with medication.  Checked [place]MP.  Medication sent for 3 months.  Urine drug screen sample collected today.    Obtained and reviewed patient utilization report from [facility] pharmacy database on 1/27/2023 12:54 PM  prior to writing prescription for controlled substance II, III or IV per [place] bill . Based on assessment of the report,my physical exam if necessary and the patient's health problem, the prescription is medically necessary.

## 2023-01-30 LAB
AMPHET CTO UR CFM-MCNC: POSITIVE NG/ML
BARBITURATES CTO UR CFM-MCNC: NEGATIVE NG/ML
BENZODIAZ CTO UR CFM-MCNC: NEGATIVE NG/ML
CANNABINOIDS CTO UR CFM-MCNC: NEGATIVE NG/ML
COCAINE CTO UR CFM-MCNC: NEGATIVE NG/ML
DRUG COMMENT 753798: NORMAL
METHADONE CTO UR CFM-MCNC: NEGATIVE NG/ML
OPIATES CTO UR CFM-MCNC: NEGATIVE NG/ML
PCP CTO UR CFM-MCNC: NEGATIVE NG/ML
PROPOXYPH CTO UR CFM-MCNC: NEGATIVE NG/ML

## 2023-02-01 LAB
AMPHET UR CFM-MCNC: 1894 NG/ML
MDA UR CFM-MCNC: <200 NG/ML
MDEA UR CFM-MCNC: <200 NG/ML
MDMA UR CFM-MCNC: <200 NG/ML
METHAMPHET UR CFM-MCNC: <200 NG/ML
PHENTERMINE UR CFM-MCNC: <200 NG/ML

## 2023-04-27 ENCOUNTER — TELEMEDICINE (OUTPATIENT)
Dept: MEDICAL GROUP | Facility: MEDICAL CENTER | Age: 43
End: 2023-04-27
Payer: COMMERCIAL

## 2023-04-27 ENCOUNTER — APPOINTMENT (OUTPATIENT)
Dept: MEDICAL GROUP | Facility: MEDICAL CENTER | Age: 43
End: 2023-04-27
Payer: COMMERCIAL

## 2023-04-27 VITALS — WEIGHT: 150 LBS | BODY MASS INDEX: 23.54 KG/M2 | HEIGHT: 67 IN

## 2023-04-27 DIAGNOSIS — F90.2 ATTENTION DEFICIT HYPERACTIVITY DISORDER (ADHD), COMBINED TYPE: ICD-10-CM

## 2023-04-27 DIAGNOSIS — K59.04 CHRONIC IDIOPATHIC CONSTIPATION: ICD-10-CM

## 2023-04-27 PROCEDURE — 99214 OFFICE O/P EST MOD 30 MIN: CPT | Performed by: FAMILY MEDICINE

## 2023-04-27 RX ORDER — DEXTROAMPHETAMINE SACCHARATE, AMPHETAMINE ASPARTATE, DEXTROAMPHETAMINE SULFATE AND AMPHETAMINE SULFATE 3.75; 3.75; 3.75; 3.75 MG/1; MG/1; MG/1; MG/1
15 TABLET ORAL 2 TIMES DAILY
Qty: 60 TABLET | Refills: 0 | Status: SHIPPED | OUTPATIENT
Start: 2023-05-05 | End: 2023-06-04

## 2023-04-27 RX ORDER — DEXTROAMPHETAMINE SACCHARATE, AMPHETAMINE ASPARTATE, DEXTROAMPHETAMINE SULFATE AND AMPHETAMINE SULFATE 3.75; 3.75; 3.75; 3.75 MG/1; MG/1; MG/1; MG/1
15 TABLET ORAL 2 TIMES DAILY
Qty: 60 TABLET | Refills: 0 | Status: SHIPPED | OUTPATIENT
Start: 2023-06-04 | End: 2023-07-04

## 2023-04-27 RX ORDER — DEXTROAMPHETAMINE SACCHARATE, AMPHETAMINE ASPARTATE, DEXTROAMPHETAMINE SULFATE AND AMPHETAMINE SULFATE 3.75; 3.75; 3.75; 3.75 MG/1; MG/1; MG/1; MG/1
15 TABLET ORAL 2 TIMES DAILY
Qty: 60 TABLET | Refills: 0 | Status: SHIPPED | OUTPATIENT
Start: 2023-07-04 | End: 2023-07-26 | Stop reason: SDUPTHER

## 2023-04-27 NOTE — PROGRESS NOTES
Virtual Visit: Established Patient   This visit was conducted via Zoom using secure and encrypted videoconferencing technology.   The patient was in their home in the [facility].    The patient's identity was confirmed and verbal consent was obtained for this virtual visit.     Subjective:   CC:   Chief Complaint   Patient presents with    Follow-Up       Deb Kaye is a 42 y.o. female presenting for follow-up for medications.    Problem   Chronic Idiopathic Constipation    She has history of constipation for a long time.  Constipation symptoms get more worse when she is in stress . At one time, she did not had bowel movement for 90 days and was seen by gastroenterology after that.  She takes fiber as needed for these symptoms.   At last visit I prescribed her linaclotide which she has not received this medication from pharmacy.     Attention Deficit Hyperactivity Disorder (Adhd), Combined Type    Currently on Adderall 15 mg twice daily.  No side effects with medication.  Controlled substance treatment agreement signed in chart.  Previous evaluation also in media section.    Last urine drug screen in January 23 consistent with prescription              ROS   Positive for constipation and attention deficit without medication    Current medicines (including changes today)  Current Outpatient Medications   Medication Sig Dispense Refill    [START ON 5/5/2023] amphetamine-dextroamphetamine (ADDERALL) 15 MG tablet Take 1 Tablet by mouth 2 times a day for 30 days. 60 Tablet 0    [START ON 6/4/2023] amphetamine-dextroamphetamine (ADDERALL) 15 MG tablet Take 1 Tablet by mouth 2 times a day for 30 days. 60 Tablet 0    [START ON 7/4/2023] amphetamine-dextroamphetamine (ADDERALL) 15 MG tablet Take 1 Tablet by mouth 2 times a day for 30 days. 60 Tablet 0    linaCLOtide 145 MCG Cap Take 1 Tablet by mouth every day. 30 Capsule 1    sulfamethoxazole-trimethoprim (BACTRIM DS) 800-160 MG tablet One-half to 1 tablet by  "mouth as a single dose immediately before or after sexual intercourse 30 Tablet 2    hydrOXYzine HCl (ATARAX) 25 MG Tab Take 1 Tablet by mouth 3 times a day as needed for Anxiety. 30 Tablet 2     No current facility-administered medications for this visit.       Patient Active Problem List    Diagnosis Date Noted    Chronic idiopathic constipation 01/27/2023    Dyslipidemia 01/27/2023    Acute cystitis without hematuria 10/25/2022    Muscle strain 04/15/2022    Attention deficit hyperactivity disorder (ADHD), combined type 07/16/2021    MARJORIE (generalized anxiety disorder) 07/16/2021        Objective:   Ht 1.702 m (5' 7\")   Wt 68 kg (150 lb)   BMI 23.49 kg/m²     Physical Exam:  Constitutional: Alert, no distress, well-groomed.  Skin: No rashes in visible areas.  Eye: Round. Conjunctiva clear, lids normal. No icterus.   ENMT: Lips pink without lesions, good dentition, moist mucous membranes. Phonation normal.  Neck: No masses, no thyromegaly. Moves freely without pain.  Respiratory: Unlabored respiratory effort, no cough or audible wheeze  Psych: Alert, normal affect and mood.     Assessment and Plan:   The following treatment plan was discussed:     Problem List Items Addressed This Visit       Chronic idiopathic constipation     Chronic problem, unstable, recommended to try linaclotide and ask pharmacy to refill this prescription.           Attention deficit hyperactivity disorder (ADHD), combined type     Chronic problem, stable, continue Adderall 15 mg 2 times daily.  Checked PDMP.  Medication sent for 3 months.    Obtained and reviewed patient utilization report from [facility] pharmacy database on 4/27/2023 12:27 PM  prior to writing prescription for controlled substance II, III or IV per [place] bill . Based on assessment of the report,my physical exam if necessary and the patient's health problem, the prescription is medically necessary.          Relevant Medications    amphetamine-dextroamphetamine " (ADDERALL) 15 MG tablet (Start on 5/5/2023)    amphetamine-dextroamphetamine (ADDERALL) 15 MG tablet (Start on 6/4/2023)    amphetamine-dextroamphetamine (ADDERALL) 15 MG tablet (Start on 7/4/2023)        Follow-up: Return in about 3 months (around 7/27/2023).

## 2023-04-27 NOTE — ASSESSMENT & PLAN NOTE
Chronic problem, unstable, recommended to try linaclotide and ask pharmacy to refill this prescription.

## 2023-04-27 NOTE — ASSESSMENT & PLAN NOTE
Chronic problem, stable, continue Adderall 15 mg 2 times daily.  Checked [place].  Medication sent for 3 months.    Obtained and reviewed patient utilization report from [facility] pharmacy database on 4/27/2023 12:27 PM  prior to writing prescription for controlled substance II, III or IV per [place] bill . Based on assessment of the report,my physical exam if necessary and the patient's health problem, the prescription is medically necessary.

## 2023-06-29 ENCOUNTER — PATIENT MESSAGE (OUTPATIENT)
Dept: MEDICAL GROUP | Facility: MEDICAL CENTER | Age: 43
End: 2023-06-29
Payer: COMMERCIAL

## 2023-06-30 ENCOUNTER — OFFICE VISIT (OUTPATIENT)
Dept: MEDICAL GROUP | Facility: MEDICAL CENTER | Age: 43
End: 2023-06-30
Payer: COMMERCIAL

## 2023-06-30 ENCOUNTER — HOSPITAL ENCOUNTER (OUTPATIENT)
Facility: MEDICAL CENTER | Age: 43
End: 2023-06-30
Attending: FAMILY MEDICINE
Payer: COMMERCIAL

## 2023-06-30 VITALS
OXYGEN SATURATION: 99 % | WEIGHT: 145.5 LBS | HEIGHT: 67 IN | DIASTOLIC BLOOD PRESSURE: 68 MMHG | BODY MASS INDEX: 22.84 KG/M2 | HEART RATE: 80 BPM | RESPIRATION RATE: 16 BRPM | SYSTOLIC BLOOD PRESSURE: 118 MMHG | TEMPERATURE: 97.9 F

## 2023-06-30 DIAGNOSIS — N89.8 VAGINAL ODOR: ICD-10-CM

## 2023-06-30 PROCEDURE — 87510 GARDNER VAG DNA DIR PROBE: CPT

## 2023-06-30 PROCEDURE — 87660 TRICHOMONAS VAGIN DIR PROBE: CPT

## 2023-06-30 PROCEDURE — 3078F DIAST BP <80 MM HG: CPT | Performed by: FAMILY MEDICINE

## 2023-06-30 PROCEDURE — 87480 CANDIDA DNA DIR PROBE: CPT

## 2023-06-30 PROCEDURE — 3074F SYST BP LT 130 MM HG: CPT | Performed by: FAMILY MEDICINE

## 2023-06-30 PROCEDURE — 99213 OFFICE O/P EST LOW 20 MIN: CPT | Performed by: FAMILY MEDICINE

## 2023-06-30 RX ORDER — METRONIDAZOLE 500 MG/1
500 TABLET ORAL
Qty: 14 TABLET | Refills: 0 | Status: SHIPPED | OUTPATIENT
Start: 2023-06-30 | End: 2023-07-07

## 2023-06-30 ASSESSMENT — ENCOUNTER SYMPTOMS
CHILLS: 0
FEVER: 0

## 2023-06-30 NOTE — PROGRESS NOTES
FAMILY MEDICINE VISIT                                                               Chief complaint::The encounter diagnosis was Vaginal odor.    History of present illness: Deb Kaye is a 42 y.o. female who presented for vaginal odor.     About 3 weeks ago she noticed that she has a very new strong and unpleasant vaginal odor when she has sexual intercourse with her boyfriend.  Discharge when she has orgasm seems to be different and causes her boyfriend burning sensation.  He is a  and she reports that they had intercourse 1 time without having him taking a shower.  He has history of psoriasis so he likes to take less shower to prevent exacerbations.  She has not noticed any odor outside of when she has orgasm during sex.  She researched about it and thinks that it may be BV.  She was initially thinking that her menstrual cycle will help regulate it back to normal but it has not.    She has been taking probiotics and also trying better about Activia, tea yogurt to help digestion.    She does not have any urinary symptoms.    Review of systems:     Review of Systems   Constitutional:  Negative for chills, fever and malaise/fatigue.   Genitourinary:         Different vaginal odor        Medications and Allergies:     Current Outpatient Medications   Medication Sig Dispense Refill    metroNIDAZOLE (FLAGYL) 500 MG Tab Take 1 Tablet by mouth 2 times a day for 7 days. 14 Tablet 0    amphetamine-dextroamphetamine (ADDERALL) 15 MG tablet Take 1 Tablet by mouth 2 times a day for 30 days. 60 Tablet 0    [START ON 7/4/2023] amphetamine-dextroamphetamine (ADDERALL) 15 MG tablet Take 1 Tablet by mouth 2 times a day for 30 days. 60 Tablet 0    sulfamethoxazole-trimethoprim (BACTRIM DS) 800-160 MG tablet One-half to 1 tablet by mouth as a single dose immediately before or after sexual intercourse 30 Tablet 2    hydrOXYzine HCl (ATARAX) 25 MG Tab Take 1 Tablet by mouth 3 times a day as needed for Anxiety.  "30 Tablet 2     No current facility-administered medications for this visit.          Vitals:    /68   Pulse 80   Temp 36.6 °C (97.9 °F)   Resp 16   Ht 1.702 m (5' 7\")   Wt 66 kg (145 lb 8.1 oz)   SpO2 99%  Body mass index is 22.79 kg/m².    Physical Exam:     Physical Exam  Constitutional:       Appearance: Normal appearance. She is well-developed and well-groomed.   HENT:      Head: Normocephalic and atraumatic.      Right Ear: External ear normal.      Left Ear: External ear normal.   Eyes:      General:         Right eye: No discharge.         Left eye: No discharge.      Conjunctiva/sclera: Conjunctivae normal.   Cardiovascular:      Rate and Rhythm: Normal rate.   Pulmonary:      Effort: Pulmonary effort is normal. No respiratory distress.   Musculoskeletal:      Cervical back: Neck supple.   Skin:     Findings: No rash.   Neurological:      Mental Status: She is alert.   Psychiatric:         Mood and Affect: Mood and affect normal.         Behavior: Behavior normal.            Assessment/Plan:         1. Vaginal odor  New problem, unstable, check vaginal pathogen DNA profile to rule out bacterial vaginosis, trichomonas infection.  We discussed about these infections, treatment for these infections this.  We will start her on metronidazole 500 mg 2 times daily for 7 days.  Recommended not to drink alcohol with this medication as this medication interacts with alcohol.    - VAGINAL PATHOGENS DNA PANEL; Future  - metroNIDAZOLE (FLAGYL) 500 MG Tab; Take 1 Tablet by mouth 2 times a day for 7 days.  Dispense: 14 Tablet; Refill: 0     Please note that this dictation was created using voice recognition software. I have made every reasonable attempt to correct obvious errors, but I expect that there are errors of grammar and possibly content that I did not discover before finalizing the note.    Follow up on 7/26/2023 for chronic condition follow-up, sooner as needed.    "

## 2023-07-01 LAB
CANDIDA DNA VAG QL PROBE+SIG AMP: NEGATIVE
G VAGINALIS DNA VAG QL PROBE+SIG AMP: POSITIVE
T VAGINALIS DNA VAG QL PROBE+SIG AMP: NEGATIVE

## 2023-07-26 ENCOUNTER — TELEMEDICINE (OUTPATIENT)
Dept: MEDICAL GROUP | Facility: MEDICAL CENTER | Age: 43
End: 2023-07-26
Payer: COMMERCIAL

## 2023-07-26 VITALS — HEIGHT: 67 IN | BODY MASS INDEX: 22.76 KG/M2 | WEIGHT: 145 LBS

## 2023-07-26 DIAGNOSIS — F90.2 ATTENTION DEFICIT HYPERACTIVITY DISORDER (ADHD), COMBINED TYPE: ICD-10-CM

## 2023-07-26 DIAGNOSIS — B96.89 BACTERIAL VAGINOSIS: ICD-10-CM

## 2023-07-26 DIAGNOSIS — N30.00 ACUTE CYSTITIS WITHOUT HEMATURIA: ICD-10-CM

## 2023-07-26 DIAGNOSIS — N76.0 BACTERIAL VAGINOSIS: ICD-10-CM

## 2023-07-26 PROCEDURE — 99214 OFFICE O/P EST MOD 30 MIN: CPT | Mod: 95 | Performed by: FAMILY MEDICINE

## 2023-07-26 RX ORDER — DEXTROAMPHETAMINE SACCHARATE, AMPHETAMINE ASPARTATE, DEXTROAMPHETAMINE SULFATE AND AMPHETAMINE SULFATE 3.75; 3.75; 3.75; 3.75 MG/1; MG/1; MG/1; MG/1
15 TABLET ORAL 2 TIMES DAILY
Qty: 60 TABLET | Refills: 0 | Status: SHIPPED | OUTPATIENT
Start: 2023-08-10 | End: 2023-09-09

## 2023-07-26 RX ORDER — GRANULES FOR ORAL 3 G/1
3 POWDER ORAL ONCE
Qty: 1 EACH | Refills: 0 | Status: SHIPPED | OUTPATIENT
Start: 2023-07-26 | End: 2023-07-26

## 2023-07-26 RX ORDER — CLINDAMYCIN PHOSPHATE 20 MG/G
1 CREAM VAGINAL NIGHTLY
Qty: 40 G | Refills: 0 | Status: SHIPPED | OUTPATIENT
Start: 2023-07-26 | End: 2023-08-02

## 2023-07-26 RX ORDER — DEXTROAMPHETAMINE SACCHARATE, AMPHETAMINE ASPARTATE, DEXTROAMPHETAMINE SULFATE AND AMPHETAMINE SULFATE 3.75; 3.75; 3.75; 3.75 MG/1; MG/1; MG/1; MG/1
15 TABLET ORAL 2 TIMES DAILY
Qty: 60 TABLET | Refills: 0 | Status: SHIPPED | OUTPATIENT
Start: 2023-09-09 | End: 2023-10-09

## 2023-07-26 RX ORDER — DEXTROAMPHETAMINE SACCHARATE, AMPHETAMINE ASPARTATE, DEXTROAMPHETAMINE SULFATE AND AMPHETAMINE SULFATE 3.75; 3.75; 3.75; 3.75 MG/1; MG/1; MG/1; MG/1
15 TABLET ORAL 2 TIMES DAILY
Qty: 60 TABLET | Refills: 0 | Status: SHIPPED | OUTPATIENT
Start: 2023-10-09 | End: 2023-11-08

## 2023-07-26 NOTE — PROGRESS NOTES
Virtual Visit: Established Patient   This visit was conducted via Zoom using secure and encrypted videoconferencing technology.   The patient was in their home in the [facility].    The patient's identity was confirmed and verbal consent was obtained for this virtual visit.     Subjective:   CC:   Chief Complaint   Patient presents with    Medication Refill       Deb Kaye is a 43 y.o. female presenting for medication follow-up, persistent bacterial vaginosis symptoms, UTI symptoms.  She has history of recurrent UTIs and she has been experiencing UTI symptoms since last few days.  She is on Bactrim as needed.  She was seen by urology and was recommended d-mannose for recurrent UTIs.  She was diagnosed with bacterial vaginosis infection few days ago and was prescribed metronidazole then afterwards clindamycin.  Her symptoms are still there.  She has weird vaginal odor.  She is going to see her gynecologist    She has history of ADHD, currently on Adderall 15 mg twice daily, no side effects with medication.  Controlled substance agreement signed in chart.  Urine drug screen on January 23 consistent with prescription.    ROS   Positive for vaginal odor, urinary symptoms      Current medicines (including changes today)  Current Outpatient Medications   Medication Sig Dispense Refill    clindamycin (CLEOCIN) 2 % vaginal cream Insert 1 Applicator into the vagina every evening for 7 days. 40 g 0    fosfomycin (MONUROL) 3 GM Pack Take 3 g by mouth one time for 1 dose. 1 Each 0    [START ON 8/10/2023] amphetamine-dextroamphetamine (ADDERALL) 15 MG tablet Take 1 Tablet by mouth 2 times a day for 30 days. 60 Tablet 0    [START ON 9/9/2023] amphetamine-dextroamphetamine (ADDERALL) 15 MG tablet Take 1 Tablet by mouth 2 times a day for 30 days. 60 Tablet 0    [START ON 10/9/2023] amphetamine-dextroamphetamine (ADDERALL) 15 MG tablet Take 1 Tablet by mouth 2 times a day for 30 days. 60 Tablet 0     "sulfamethoxazole-trimethoprim (BACTRIM DS) 800-160 MG tablet One-half to 1 tablet by mouth as a single dose immediately before or after sexual intercourse 30 Tablet 2    hydrOXYzine HCl (ATARAX) 25 MG Tab Take 1 Tablet by mouth 3 times a day as needed for Anxiety. 30 Tablet 2     No current facility-administered medications for this visit.       Patient Active Problem List    Diagnosis Date Noted    Chronic idiopathic constipation 01/27/2023    Dyslipidemia 01/27/2023    Acute cystitis without hematuria 10/25/2022    Muscle strain 04/15/2022    Attention deficit hyperactivity disorder (ADHD), combined type 07/16/2021    MARJORIE (generalized anxiety disorder) 07/16/2021        Objective:   Ht 1.702 m (5' 7\")   Wt 65.8 kg (145 lb)   BMI 22.71 kg/m²     Physical Exam:  Constitutional: Alert, no distress, well-groomed.  Skin: No rashes in visible areas.  Eye: Round. Conjunctiva clear, lids normal. No icterus.   ENMT: Lips pink without lesions, good dentition, moist mucous membranes. Phonation normal.  Neck: No masses, no thyromegaly. Moves freely without pain.  Respiratory: Unlabored respiratory effort, no cough or audible wheeze  Psych: Alert and oriented x3, normal affect and mood.     Assessment and Plan:   The following treatment plan was discussed:     1. Acute cystitis without hematuria:  New problem, unstable, start fosfomycin and check urine culture before starting medication    - URINE CULTURE(NEW); Future  - fosfomycin (MONUROL) 3 GM Pack; Take 3 g by mouth one time for 1 dose.  Dispense: 1 Each; Refill: 0    2. Bacterial vaginosis  Chronic problem, unstable, start clindamycin vaginal cream, follow-up with gynecology  - clindamycin (CLEOCIN) 2 % vaginal cream; Insert 1 Applicator into the vagina every evening for 7 days.  Dispense: 40 g; Refill: 0    3. Attention deficit hyperactivity disorder (ADHD), combined type  Chronic problem, stable, continue Adderall at same dose.  Check PDMP.  Medication sent to " pharmacy    Obtained and reviewed patient utilization report from [facility] pharmacy database on 7/26/2023 3:58 PM  prior to writing prescription for controlled substance II, III or IV per [place] bill . Based on assessment of the report,my physical exam if necessary and the patient's health problem, the prescription is medically necessary.      - amphetamine-dextroamphetamine (ADDERALL) 15 MG tablet; Take 1 Tablet by mouth 2 times a day for 30 days.  Dispense: 60 Tablet; Refill: 0  - amphetamine-dextroamphetamine (ADDERALL) 15 MG tablet; Take 1 Tablet by mouth 2 times a day for 30 days.  Dispense: 60 Tablet; Refill: 0  - amphetamine-dextroamphetamine (ADDERALL) 15 MG tablet; Take 1 Tablet by mouth 2 times a day for 30 days.  Dispense: 60 Tablet; Refill: 0      Follow-up: Return in about 3 months (around 10/26/2023).

## 2023-07-27 ENCOUNTER — HOSPITAL ENCOUNTER (OUTPATIENT)
Facility: MEDICAL CENTER | Age: 43
End: 2023-07-27
Attending: FAMILY MEDICINE
Payer: COMMERCIAL

## 2023-07-27 DIAGNOSIS — N30.00 ACUTE CYSTITIS WITHOUT HEMATURIA: ICD-10-CM

## 2023-07-27 PROCEDURE — 87186 SC STD MICRODIL/AGAR DIL: CPT

## 2023-07-27 PROCEDURE — 87086 URINE CULTURE/COLONY COUNT: CPT

## 2023-07-27 PROCEDURE — 87077 CULTURE AEROBIC IDENTIFY: CPT

## 2023-08-24 ENCOUNTER — HOSPITAL ENCOUNTER (OUTPATIENT)
Dept: LAB | Facility: MEDICAL CENTER | Age: 43
End: 2023-08-24
Attending: FAMILY MEDICINE
Payer: COMMERCIAL

## 2023-08-24 DIAGNOSIS — N39.0 URINARY TRACT INFECTION WITHOUT HEMATURIA, SITE UNSPECIFIED: ICD-10-CM

## 2023-08-24 LAB
APPEARANCE UR: CLEAR
BILIRUB UR QL STRIP.AUTO: NEGATIVE
COLOR UR: YELLOW
GLUCOSE UR STRIP.AUTO-MCNC: NEGATIVE MG/DL
KETONES UR STRIP.AUTO-MCNC: NEGATIVE MG/DL
LEUKOCYTE ESTERASE UR QL STRIP.AUTO: NEGATIVE
MICRO URNS: NORMAL
NITRITE UR QL STRIP.AUTO: NEGATIVE
PH UR STRIP.AUTO: 6.5 [PH] (ref 5–8)
PROT UR QL STRIP: NEGATIVE MG/DL
RBC UR QL AUTO: NEGATIVE
SP GR UR STRIP.AUTO: 1.01
UROBILINOGEN UR STRIP.AUTO-MCNC: 0.2 MG/DL

## 2023-08-24 PROCEDURE — 87186 SC STD MICRODIL/AGAR DIL: CPT

## 2023-08-24 PROCEDURE — 87077 CULTURE AEROBIC IDENTIFY: CPT

## 2023-08-24 PROCEDURE — 87086 URINE CULTURE/COLONY COUNT: CPT

## 2023-08-24 PROCEDURE — 81003 URINALYSIS AUTO W/O SCOPE: CPT

## 2023-09-12 ENCOUNTER — GYNECOLOGY VISIT (OUTPATIENT)
Dept: OBGYN | Facility: CLINIC | Age: 43
End: 2023-09-12
Payer: COMMERCIAL

## 2023-09-12 ENCOUNTER — HOSPITAL ENCOUNTER (OUTPATIENT)
Facility: MEDICAL CENTER | Age: 43
End: 2023-09-12
Attending: OBSTETRICS & GYNECOLOGY
Payer: COMMERCIAL

## 2023-09-12 VITALS — BODY MASS INDEX: 23.02 KG/M2 | WEIGHT: 147 LBS | DIASTOLIC BLOOD PRESSURE: 70 MMHG | SYSTOLIC BLOOD PRESSURE: 109 MMHG

## 2023-09-12 DIAGNOSIS — Z01.419 WOMEN'S ANNUAL ROUTINE GYNECOLOGICAL EXAMINATION: ICD-10-CM

## 2023-09-12 PROCEDURE — 99396 PREV VISIT EST AGE 40-64: CPT | Performed by: OBSTETRICS & GYNECOLOGY

## 2023-09-12 PROCEDURE — 87624 HPV HI-RISK TYP POOLED RSLT: CPT

## 2023-09-12 PROCEDURE — 3074F SYST BP LT 130 MM HG: CPT | Performed by: OBSTETRICS & GYNECOLOGY

## 2023-09-12 PROCEDURE — 99213 OFFICE O/P EST LOW 20 MIN: CPT | Mod: 25 | Performed by: OBSTETRICS & GYNECOLOGY

## 2023-09-12 PROCEDURE — 3078F DIAST BP <80 MM HG: CPT | Performed by: OBSTETRICS & GYNECOLOGY

## 2023-09-12 PROCEDURE — 88175 CYTOPATH C/V AUTO FLUID REDO: CPT

## 2023-09-12 RX ORDER — CEPHALEXIN 250 MG/1
250 CAPSULE ORAL 4 TIMES DAILY
Qty: 40 CAPSULE | Refills: 0 | Status: SHIPPED | OUTPATIENT
Start: 2023-09-12

## 2023-09-12 RX ORDER — FLUCONAZOLE 100 MG/1
100 TABLET ORAL DAILY
Qty: 1 TABLET | Refills: 1 | Status: SHIPPED | OUTPATIENT
Start: 2023-09-12

## 2023-09-12 NOTE — PROGRESS NOTES
Annual examination;  This patient is a 43 y.o. female  using BTL for birth control.  Patient has trouble with chronic UTIs she has used Bactrim  Sensitivities indicate that patient may more benefit from a cephalosporin antibiotic    Last mammogram-2022 normal    /70 (BP Location: Right arm, Patient Position: Sitting, BP Cuff Size: Small adult)   Wt 147 lb   LMP 2023 (Approximate)   BMI 23.02 kg/m²     Physical examination;  Breast examination- No dominant masses, No skin retraction, No axillary adenopathy, bilateral breast implants intact    Pelvic examination;  External genitalia-No visible lesions, urethra normal in appearance, [place]'s glands normal  Vagina-thank you blood or discharge, bladder normal in position without gross lesions  Cervix-No gross lesions, Pap smear taken  Uterus-Normal size and shape,  No tenderness  Adnexa No mass or tenderness    Abdomen-nondistended positive bowel sounds soft nontender without masses or hepatosplenomegaly    Impression;  Normal annual  Chronic UTIs    Plan;  BTL for BC   Check PAP  Continue annual mammogram  Keflex 250 4 times daily x14 days  Diflucan 100 mg tablets in case of yeast infection after Keflex  Female chaperone present during entire history and physical examination

## 2023-09-16 LAB
CYTOLOGIST CVX/VAG CYTO: NORMAL
CYTOLOGY CVX/VAG DOC CYTO: NORMAL
CYTOLOGY CVX/VAG DOC THIN PREP: NORMAL
HPV I/H RISK 4 DNA CVX QL PROBE+SIG AMP: NEGATIVE
HPV REFLEX NL1174300: NORMAL
NOTE NL11727A: NORMAL
OTHER STN SPEC: NORMAL
STAT OF ADQ CVX/VAG CYTO-IMP: NORMAL

## 2023-10-26 ENCOUNTER — HOSPITAL ENCOUNTER (OUTPATIENT)
Dept: RADIOLOGY | Facility: MEDICAL CENTER | Age: 43
End: 2023-10-26
Attending: OBSTETRICS & GYNECOLOGY
Payer: COMMERCIAL

## 2023-10-26 DIAGNOSIS — Z01.419 WOMEN'S ANNUAL ROUTINE GYNECOLOGICAL EXAMINATION: ICD-10-CM

## 2023-10-26 PROCEDURE — 77063 BREAST TOMOSYNTHESIS BI: CPT

## 2023-11-30 ENCOUNTER — TELEMEDICINE (OUTPATIENT)
Dept: MEDICAL GROUP | Facility: MEDICAL CENTER | Age: 43
End: 2023-11-30
Payer: COMMERCIAL

## 2023-11-30 VITALS — WEIGHT: 145 LBS | BODY MASS INDEX: 22.76 KG/M2 | HEIGHT: 67 IN

## 2023-11-30 DIAGNOSIS — Z51.81 MEDICATION MONITORING ENCOUNTER: ICD-10-CM

## 2023-11-30 DIAGNOSIS — F90.2 ATTENTION DEFICIT HYPERACTIVITY DISORDER (ADHD), COMBINED TYPE: ICD-10-CM

## 2023-11-30 PROCEDURE — 99213 OFFICE O/P EST LOW 20 MIN: CPT | Mod: 95 | Performed by: FAMILY MEDICINE

## 2023-11-30 RX ORDER — DEXTROAMPHETAMINE SACCHARATE, AMPHETAMINE ASPARTATE, DEXTROAMPHETAMINE SULFATE AND AMPHETAMINE SULFATE 3.75; 3.75; 3.75; 3.75 MG/1; MG/1; MG/1; MG/1
15 TABLET ORAL 2 TIMES DAILY
Qty: 60 TABLET | Refills: 0 | Status: SHIPPED | OUTPATIENT
Start: 2023-12-30 | End: 2024-01-29

## 2023-11-30 RX ORDER — DEXTROAMPHETAMINE SACCHARATE, AMPHETAMINE ASPARTATE, DEXTROAMPHETAMINE SULFATE AND AMPHETAMINE SULFATE 3.75; 3.75; 3.75; 3.75 MG/1; MG/1; MG/1; MG/1
15 TABLET ORAL 2 TIMES DAILY
Qty: 60 TABLET | Refills: 0 | Status: SHIPPED | OUTPATIENT
Start: 2023-11-30 | End: 2023-12-30

## 2023-11-30 RX ORDER — DEXTROAMPHETAMINE SACCHARATE, AMPHETAMINE ASPARTATE, DEXTROAMPHETAMINE SULFATE AND AMPHETAMINE SULFATE 3.75; 3.75; 3.75; 3.75 MG/1; MG/1; MG/1; MG/1
15 TABLET ORAL 2 TIMES DAILY
Qty: 60 TABLET | Refills: 0 | Status: SHIPPED | OUTPATIENT
Start: 2024-01-29 | End: 2024-02-28

## 2023-11-30 NOTE — ASSESSMENT & PLAN NOTE
Chronic problem, stable, continue Adderall 50 mg twice daily.  Check PDMP.  Medication sent for 3 months.  We will see her in office for the urine drug screen.  We will send her controlled prescription contract to sign on [email or web address].    Obtained and reviewed patient utilization report from [facility] pharmacy database on 11/30/2023 3:32 PM  prior to writing prescription for controlled substance II, III or IV per [place] bill . Based on assessment of the report,my physical exam if necessary and the patient's health problem, the prescription is medically necessary.

## 2023-11-30 NOTE — PROGRESS NOTES
Virtual Visit: Established Patient   This visit was conducted via Zoom using secure and encrypted videoconferencing technology.   The patient was in their home in the [facility].    The patient's identity was confirmed and verbal consent was obtained for this virtual visit.     Subjective:   CC:   Chief Complaint   Patient presents with    Medication Refill       Deb Kaye is a 43 y.o. female presenting for medication refill.    Problem   Attention Deficit Hyperactivity Disorder (Adhd), Combined Type    Currently on Adderall 15 mg twice daily.  No side effects with medication.    Previous evaluation also in media section.    Last urine drug screen in January 23 consistent with prescription.  Due for signing controlled prescription contract.  She denies any side effects with Adderall 15 mg 2 times daily.  Able to focus, concentrate with this medication.              ROS   Positive for concentration deficit without medication    Current medicines (including changes today)  Current Outpatient Medications   Medication Sig Dispense Refill    amphetamine-dextroamphetamine (ADDERALL, 15MG,) 15 MG tablet Take 1 Tablet by mouth 2 times a day for 30 days. 60 Tablet 0    [START ON 12/30/2023] amphetamine-dextroamphetamine (ADDERALL, 15MG,) 15 MG tablet Take 1 Tablet by mouth 2 times a day for 30 days. 60 Tablet 0    [START ON 1/29/2024] amphetamine-dextroamphetamine (ADDERALL, 15MG,) 15 MG tablet Take 1 Tablet by mouth 2 times a day for 30 days. 60 Tablet 0    hydrOXYzine HCl (ATARAX) 25 MG Tab Take 1 Tablet by mouth 3 times a day as needed for Anxiety. 30 Tablet 2    cephALEXin (KEFLEX) 250 MG Cap Take 1 Capsule by mouth 4 times a day. 40 Capsule 0    fluconazole (DIFLUCAN) 100 MG Tab Take 1 Tablet by mouth every day. 1 Tablet 1    sulfamethoxazole-trimethoprim (BACTRIM DS) 800-160 MG tablet One-half to 1 tablet by mouth as a single dose immediately before or after sexual intercourse 30 Tablet 2     No current  "facility-administered medications for this visit.       Patient Active Problem List    Diagnosis Date Noted    Chronic idiopathic constipation 01/27/2023    Dyslipidemia 01/27/2023    Acute cystitis without hematuria 10/25/2022    Muscle strain 04/15/2022    Attention deficit hyperactivity disorder (ADHD), combined type 07/16/2021    MARJORIE (generalized anxiety disorder) 07/16/2021        Objective:   Ht 1.702 m (5' 7\")   Wt 65.8 kg (145 lb)   BMI 22.71 kg/m²     Physical Exam:  Constitutional: Alert, no distress, well-groomed.  Skin: No rashes in visible areas.  Eye: Round. Conjunctiva clear, lids normal. No icterus.   ENMT: Lips pink without lesions, good dentition, moist mucous membranes. Phonation normal.  Neck: No masses, no thyromegaly. Moves freely without pain.  Respiratory: Unlabored respiratory effort, no cough or audible wheeze  Psych: Alert and oriented x3, normal affect and mood.     Assessment and Plan:   The following treatment plan was discussed:     Problem List Items Addressed This Visit       Attention deficit hyperactivity disorder (ADHD), combined type     Chronic problem, stable, continue Adderall 50 mg twice daily.  Check PDMP.  Medication sent for 3 months.  We will see her in office for the urine drug screen.  We will send her controlled prescription contract to sign on [employer].    Obtained and reviewed patient utilization report from [facility] pharmacy database on 11/30/2023 3:32 PM  prior to writing prescription for controlled substance II, III or IV per [place] bill . Based on assessment of the report,my physical exam if necessary and the patient's health problem, the prescription is medically necessary.           Relevant Medications    amphetamine-dextroamphetamine (ADDERALL, 15MG,) 15 MG tablet    amphetamine-dextroamphetamine (ADDERALL, 15MG,) 15 MG tablet (Start on 12/30/2023)    amphetamine-dextroamphetamine (ADDERALL, 15MG,) 15 MG tablet (Start on 1/29/2024)    Other Relevant " Orders    Controlled Substance Treatment Agreement     Other Visit Diagnoses       Medication monitoring encounter                 Follow-up: Return in about 3 months (around 2/29/2024).

## 2024-01-31 ENCOUNTER — APPOINTMENT (OUTPATIENT)
Dept: MEDICAL GROUP | Facility: MEDICAL CENTER | Age: 44
End: 2024-01-31
Payer: COMMERCIAL

## 2024-03-01 ENCOUNTER — APPOINTMENT (OUTPATIENT)
Dept: MEDICAL GROUP | Facility: MEDICAL CENTER | Age: 44
End: 2024-03-01
Payer: COMMERCIAL

## 2024-03-07 ENCOUNTER — OFFICE VISIT (OUTPATIENT)
Dept: MEDICAL GROUP | Facility: MEDICAL CENTER | Age: 44
End: 2024-03-07
Payer: COMMERCIAL

## 2024-03-07 ENCOUNTER — HOSPITAL ENCOUNTER (OUTPATIENT)
Facility: MEDICAL CENTER | Age: 44
End: 2024-03-07
Attending: FAMILY MEDICINE
Payer: COMMERCIAL

## 2024-03-07 VITALS
RESPIRATION RATE: 16 BRPM | HEART RATE: 92 BPM | OXYGEN SATURATION: 98 % | HEIGHT: 67 IN | DIASTOLIC BLOOD PRESSURE: 58 MMHG | SYSTOLIC BLOOD PRESSURE: 118 MMHG | WEIGHT: 155 LBS | BODY MASS INDEX: 24.33 KG/M2 | TEMPERATURE: 97.5 F

## 2024-03-07 DIAGNOSIS — Z51.81 MEDICATION MONITORING ENCOUNTER: ICD-10-CM

## 2024-03-07 DIAGNOSIS — M25.551 RIGHT HIP PAIN: ICD-10-CM

## 2024-03-07 DIAGNOSIS — F90.2 ATTENTION DEFICIT HYPERACTIVITY DISORDER (ADHD), COMBINED TYPE: ICD-10-CM

## 2024-03-07 DIAGNOSIS — F41.1 GAD (GENERALIZED ANXIETY DISORDER): ICD-10-CM

## 2024-03-07 PROCEDURE — G0480 DRUG TEST DEF 1-7 CLASSES: HCPCS

## 2024-03-07 PROCEDURE — 99214 OFFICE O/P EST MOD 30 MIN: CPT | Performed by: FAMILY MEDICINE

## 2024-03-07 PROCEDURE — 80307 DRUG TEST PRSMV CHEM ANLYZR: CPT

## 2024-03-07 PROCEDURE — 3078F DIAST BP <80 MM HG: CPT | Performed by: FAMILY MEDICINE

## 2024-03-07 PROCEDURE — 3074F SYST BP LT 130 MM HG: CPT | Performed by: FAMILY MEDICINE

## 2024-03-07 RX ORDER — LISDEXAMFETAMINE DIMESYLATE CAPSULES 20 MG/1
20 CAPSULE ORAL EVERY MORNING
Qty: 10 CAPSULE | Refills: 0 | Status: SHIPPED | OUTPATIENT
Start: 2024-03-07 | End: 2024-03-17

## 2024-03-07 RX ORDER — HYDROXYZINE HYDROCHLORIDE 25 MG/1
25 TABLET, FILM COATED ORAL 3 TIMES DAILY PRN
Qty: 30 TABLET | Refills: 2 | Status: SHIPPED | OUTPATIENT
Start: 2024-03-07

## 2024-03-07 RX ORDER — LISDEXAMFETAMINE DIMESYLATE CAPSULES 10 MG/1
1 CAPSULE ORAL
Qty: 10 CAPSULE | Refills: 0 | Status: SHIPPED | OUTPATIENT
Start: 2024-03-07 | End: 2024-03-17

## 2024-03-07 ASSESSMENT — ENCOUNTER SYMPTOMS
PALPITATIONS: 0
FEVER: 0
CHILLS: 0

## 2024-03-07 ASSESSMENT — PATIENT HEALTH QUESTIONNAIRE - PHQ9: CLINICAL INTERPRETATION OF PHQ2 SCORE: 0

## 2024-03-07 NOTE — ASSESSMENT & PLAN NOTE
Chronic problem, stable, we will try Vyvanse 20 mg in the morning and 10 mg in afternoon.  She will let me know on MyChart if this medication is effective then we will continue same dose but if this is not effective then we will increase Adderall to 20 mg 2 times daily.  Urine drug screen did today.  Check PDMP.    Obtained and reviewed patient utilization report from [facility] pharmacy database on 3/7/2024 9:04 AM  prior to writing prescription for controlled substance II, III or IV per [place] bill . Based on assessment of the report,my physical exam if necessary and the patient's health problem, the prescription is medically necessary.

## 2024-03-07 NOTE — PROGRESS NOTES
FAMILY MEDICINE VISIT                                                               Assessment/Plan:         Problem List Items Addressed This Visit       Attention deficit hyperactivity disorder (ADHD), combined type     Chronic problem, stable, we will try Vyvanse 20 mg in the morning and 10 mg in afternoon.  She will let me know on MyChart if this medication is effective then we will continue same dose but if this is not effective then we will increase Adderall to 20 mg 2 times daily.  Urine drug screen did today.  Check PDMP.    Obtained and reviewed patient utilization report from [facility] pharmacy database on 3/7/2024 9:04 AM  prior to writing prescription for controlled substance II, III or IV per [place] bill . Based on assessment of the report,my physical exam if necessary and the patient's health problem, the prescription is medically necessary.           Relevant Medications    lisdexamfetamine (VYVANSE) 20 MG Cap    Lisdexamfetamine Dimesylate 10 MG Cap    MARJORIE (generalized anxiety disorder)     Chronic problem, stable, continue Atarax at same dose.  Refilled medication         Relevant Medications    hydrOXYzine HCl (ATARAX) 25 MG Tab    Right hip pain     New problem, unstable, check hip x-ray, will refer to physical therapy based on these results         Relevant Orders    DX-HIP-COMPLETE - UNILATERAL 2+ RIGHT     Other Visit Diagnoses       Medication monitoring encounter        Relevant Orders    URINE DRUG SCREEN W/CONF (AR)               Follow up in 3 months for medication follow-up      Chief complaint::Diagnoses of Attention deficit hyperactivity disorder (ADHD), combined type, MARJORIE (generalized anxiety disorder), Medication monitoring encounter, and Right hip pain were pertinent to this visit.    History of present illness: Deb Kaye is a 43 y.o. female who presented for medication follow-up, hip pain    Problem   Right Hip Pain    She is having her right hip pain since last 6  "months.  She got new mattress that may have exacerbated this pain.  Denies any trauma.  Pain is intermittent in nature.  Mother has history of rheumatoid arthritis     Attention Deficit Hyperactivity Disorder (Adhd), Combined Type    Currently on Adderall 15 mg twice daily.  No side effects with medication.    Previous evaluation also in media section.  She is having tolerance to this medication and this medication is not much effective now  Last urine drug screen in January 23 consistent with prescription.  Controlled substance treatment signed in chart     Riley (Generalized Anxiety Disorder)    History of anxiety symptoms, doing well with hydroxyzine 25 mg 3 times daily as needed.  Requested refill            Review of systems:     Review of Systems   Constitutional:  Negative for chills, fever and malaise/fatigue.   Cardiovascular:  Negative for chest pain, palpitations and leg swelling.   Musculoskeletal:         Right hip pain        Medications and Allergies:     Current Outpatient Medications   Medication Sig Dispense Refill    lisdexamfetamine (VYVANSE) 20 MG Cap Take 1 Capsule by mouth every morning for 10 days. 10 Capsule 0    Lisdexamfetamine Dimesylate 10 MG Cap Take 1 Tablet by mouth every day with lunch for 10 days. 10 Capsule 0    hydrOXYzine HCl (ATARAX) 25 MG Tab Take 1 Tablet by mouth 3 times a day as needed for Anxiety. 30 Tablet 2    cephALEXin (KEFLEX) 250 MG Cap Take 1 Capsule by mouth 4 times a day. 40 Capsule 0    fluconazole (DIFLUCAN) 100 MG Tab Take 1 Tablet by mouth every day. 1 Tablet 1    sulfamethoxazole-trimethoprim (BACTRIM DS) 800-160 MG tablet One-half to 1 tablet by mouth as a single dose immediately before or after sexual intercourse 30 Tablet 2     No current facility-administered medications for this visit.          Vitals:    /58   Pulse 92   Temp 36.4 °C (97.5 °F)   Resp 16   Ht 1.702 m (5' 7\")   Wt 70.3 kg (155 lb)   SpO2 98%  Body mass index is 24.28 " kg/m².    Physical Exam:     Physical Exam  Constitutional:       Appearance: Normal appearance. She is well-developed and well-groomed.   HENT:      Head: Normocephalic and atraumatic.      Right Ear: External ear normal.      Left Ear: External ear normal.   Eyes:      General:         Right eye: No discharge.         Left eye: No discharge.      Conjunctiva/sclera: Conjunctivae normal.   Cardiovascular:      Rate and Rhythm: Normal rate.   Pulmonary:      Effort: Pulmonary effort is normal. No respiratory distress.   Musculoskeletal:      Cervical back: Neck supple.   Skin:     Findings: No rash.   Neurological:      Mental Status: She is alert.   Psychiatric:         Mood and Affect: Mood and affect normal.         Behavior: Behavior normal.                  Please note that this dictation was created using voice recognition software. I have made every reasonable attempt to correct obvious errors, but I expect that there are errors of grammar and possibly content that I did not discover before finalizing the note.

## 2024-03-09 LAB
AMPHET CTO UR CFM-MCNC: NORMAL NG/ML
BARBITURATES CTO UR CFM-MCNC: NEGATIVE NG/ML
BENZODIAZ CTO UR CFM-MCNC: NEGATIVE NG/ML
CANNABINOIDS CTO UR CFM-MCNC: NEGATIVE NG/ML
COCAINE CTO UR CFM-MCNC: NEGATIVE NG/ML
CREAT UR-MCNC: 41.8 MG/DL (ref 20–400)
DRUG COMMENT 753798: NORMAL
METHADONE CTO UR CFM-MCNC: NEGATIVE NG/ML
OPIATES CTO UR CFM-MCNC: NEGATIVE NG/ML
PCP CTO UR CFM-MCNC: NEGATIVE NG/ML
PROPOXYPH CTO UR CFM-MCNC: NEGATIVE NG/ML

## 2024-03-11 LAB
AMPHET UR CFM-MCNC: 2321 NG/ML
MDA UR CFM-MCNC: <200 NG/ML
MDEA UR CFM-MCNC: <200 NG/ML
MDMA UR CFM-MCNC: <200 NG/ML
METHAMPHET UR CFM-MCNC: <200 NG/ML
PHENTERMINE UR CFM-MCNC: <200 NG/ML

## 2024-03-14 ENCOUNTER — APPOINTMENT (OUTPATIENT)
Dept: RADIOLOGY | Facility: MEDICAL CENTER | Age: 44
End: 2024-03-14
Attending: FAMILY MEDICINE
Payer: COMMERCIAL

## 2024-03-14 DIAGNOSIS — M25.551 RIGHT HIP PAIN: ICD-10-CM

## 2024-03-14 PROCEDURE — 73502 X-RAY EXAM HIP UNI 2-3 VIEWS: CPT | Mod: RT

## 2024-03-17 ENCOUNTER — OFFICE VISIT (OUTPATIENT)
Dept: URGENT CARE | Facility: PHYSICIAN GROUP | Age: 44
End: 2024-03-17
Payer: COMMERCIAL

## 2024-03-17 ENCOUNTER — HOSPITAL ENCOUNTER (OUTPATIENT)
Facility: MEDICAL CENTER | Age: 44
End: 2024-03-17
Attending: FAMILY MEDICINE
Payer: COMMERCIAL

## 2024-03-17 VITALS
SYSTOLIC BLOOD PRESSURE: 112 MMHG | OXYGEN SATURATION: 99 % | TEMPERATURE: 97.2 F | RESPIRATION RATE: 16 BRPM | BODY MASS INDEX: 23.23 KG/M2 | HEART RATE: 84 BPM | DIASTOLIC BLOOD PRESSURE: 62 MMHG | HEIGHT: 67 IN | WEIGHT: 148 LBS

## 2024-03-17 DIAGNOSIS — N30.01 ACUTE CYSTITIS WITH HEMATURIA: ICD-10-CM

## 2024-03-17 LAB
APPEARANCE UR: NORMAL
BILIRUB UR STRIP-MCNC: NEGATIVE MG/DL
COLOR UR AUTO: NORMAL
GLUCOSE UR STRIP.AUTO-MCNC: NEGATIVE MG/DL
KETONES UR STRIP.AUTO-MCNC: NEGATIVE MG/DL
LEUKOCYTE ESTERASE UR QL STRIP.AUTO: NORMAL
NITRITE UR QL STRIP.AUTO: POSITIVE
PH UR STRIP.AUTO: 7 [PH] (ref 5–8)
PROT UR QL STRIP: 30 MG/DL
RBC UR QL AUTO: NORMAL
SP GR UR STRIP.AUTO: 1.02
UROBILINOGEN UR STRIP-MCNC: 1 MG/DL

## 2024-03-17 PROCEDURE — 87077 CULTURE AEROBIC IDENTIFY: CPT

## 2024-03-17 PROCEDURE — 3078F DIAST BP <80 MM HG: CPT | Performed by: FAMILY MEDICINE

## 2024-03-17 PROCEDURE — 87186 SC STD MICRODIL/AGAR DIL: CPT

## 2024-03-17 PROCEDURE — 99213 OFFICE O/P EST LOW 20 MIN: CPT | Performed by: FAMILY MEDICINE

## 2024-03-17 PROCEDURE — 81002 URINALYSIS NONAUTO W/O SCOPE: CPT | Performed by: FAMILY MEDICINE

## 2024-03-17 PROCEDURE — 87086 URINE CULTURE/COLONY COUNT: CPT

## 2024-03-17 PROCEDURE — 3074F SYST BP LT 130 MM HG: CPT | Performed by: FAMILY MEDICINE

## 2024-03-17 RX ORDER — NITROFURANTOIN 25; 75 MG/1; MG/1
100 CAPSULE ORAL 2 TIMES DAILY
Qty: 10 CAPSULE | Refills: 0 | Status: SHIPPED | OUTPATIENT
Start: 2024-03-17 | End: 2024-03-22

## 2024-03-17 NOTE — PROGRESS NOTES
Subjective:      CC:  presents with Dysuria            Dysuria   This is a new problem. The current episode started in the past 7 days. The problem occurs every urination. The problem has been unchanged. The quality of the pain is described as burning. There has been no fever. Pt is not sexually active.   No vaginal discharge.    +There is no history of pyelonephritis. Associated symptoms include frequency and urgency. Pertinent negatives include no chills, discharge, flank pain, nausea or vomiting. Pt has tried nothing for the symptoms. There is no history of recurrent UTIs.     Social History     Socioeconomic History    Marital status:      Spouse name: Not on file    Number of children: Not on file    Years of education: Not on file    Highest education level: Associate degree: occupational, technical, or vocational program   Occupational History    Not on file   Tobacco Use    Smoking status: Former    Smokeless tobacco: Never    Tobacco comments:     smoking on and off since age 18   Substance and Sexual Activity    Alcohol use: Yes     Alcohol/week: 4.2 oz     Types: 7 Glasses of wine per week    Drug use: Yes     Types: Marijuana    Sexual activity: Yes     Partners: Male     Birth control/protection: Female Sterilization     Comment: Work at [employer]   Other Topics Concern    Not on file   Social History Narrative    Not on file     Social Determinants of Health     Financial Resource Strain: Low Risk  (7/16/2021)    Overall Financial Resource Strain (CARDIA)     Difficulty of Paying Living Expenses: Not hard at all   Food Insecurity: No Food Insecurity (7/16/2021)    Hunger Vital Sign     Worried About Running Out of Food in the Last Year: Never true     Ran Out of Food in the Last Year: Never true   Transportation Needs: No Transportation Needs (7/16/2021)    PRAPARE - Transportation     Lack of Transportation (Medical): No     Lack of Transportation (Non-Medical): No   Physical Activity:  Sufficiently Active (7/16/2021)    Exercise Vital Sign     Days of Exercise per Week: 7 days     Minutes of Exercise per Session: 40 min   Stress: No Stress Concern Present (7/16/2021)    [place] [employer] - Occupational Stress Questionnaire     Feeling of Stress : Only a little   Social Connections: Unknown (7/16/2021)    Social Connection and Isolation Panel [NHANES]     Frequency of Communication with Friends and Family: More than three times a week     Frequency of Social Gatherings with Friends and Family: Twice a week     Attends [employer] Services: Patient declined     Active Member of Clubs or Organizations: No     Attends Club or Organization Meetings: Patient declined     Marital Status:    Intimate Partner Violence: Not on file   Housing Stability: Low Risk  (7/16/2021)    Housing Stability Vital Sign     Unable to Pay for Housing in the Last Year: No     Number of Places Lived in the Last Year: 2     Unstable Housing in the Last Year: No         Family History   Problem Relation Age of Onset    Arthritis Mother         RA    Anxiety disorder Mother     Hypertension Mother     Hyperlipidemia Mother     Depression Mother     Cancer Maternal Grandmother         Colon cancer    Alzheimer's Disease Maternal Grandmother     Depression Maternal Grandmother     Anxiety disorder Maternal Grandmother     ADD / ADHD Sister     Depression Sister     Anxiety disorder Sister          No Known Allergies        Current Outpatient Medications on File Prior to Visit   Medication Sig Dispense Refill    lisdexamfetamine (VYVANSE) 20 MG Cap Take 1 Capsule by mouth every morning for 10 days. 10 Capsule 0    Lisdexamfetamine Dimesylate 10 MG Cap Take 1 Tablet by mouth every day with lunch for 10 days. 10 Capsule 0    hydrOXYzine HCl (ATARAX) 25 MG Tab Take 1 Tablet by mouth 3 times a day as needed for Anxiety. 30 Tablet 2    fluconazole (DIFLUCAN) 100 MG Tab Take 1 Tablet by mouth every day. 1  "Tablet 1    sulfamethoxazole-trimethoprim (BACTRIM DS) 800-160 MG tablet One-half to 1 tablet by mouth as a single dose immediately before or after sexual intercourse 30 Tablet 2    cephALEXin (KEFLEX) 250 MG Cap Take 1 Capsule by mouth 4 times a day. (Patient not taking: Reported on 3/17/2024) 40 Capsule 0     No current facility-administered medications on file prior to visit.       Review of Systems   Constitutional: Negative for chills.   Respiratory: Negative for shortness of breath.    Cardiovascular: Negative for chest pain.   Gastrointestinal: Negative for nausea, vomiting and abdominal pain.   Genitourinary: Positive for dysuria, urgency and frequency. Negative for flank pain.   Skin: Negative for rash.   Neurological: Negative for dizziness and headaches.   All other systems reviewed and are negative.         Objective:      /62   Pulse 84   Temp 36.2 °C (97.2 °F) (Temporal)   Resp 16   Ht 1.702 m (5' 7\")   Wt 67.1 kg (148 lb)   SpO2 99%       Physical Exam   Constitutional: pt is oriented to person, place, and time. Pt appears well-developed and well-nourished. No distress.   HENT:   Head: Normocephalic and atraumatic.   Mouth/Throat: Mucous membranes are normal.   Eyes: Conjunctivae and EOM are normal. Pupils are equal, round, and reactive to light. Right eye exhibits no discharge. Left eye exhibits no discharge. No scleral icterus.   Neck: Normal range of motion. Neck supple.   Cardiovascular: Normal rate, regular rhythm, normal heart sounds and intact distal pulses.    No murmur heard.  Pulmonary/Chest: Effort normal and breath sounds normal. No respiratory distress. Pt has no wheezes,  rales.   Abdominal: Bowel sounds are normal. Pt exhibits no distension and no mass. There is no tenderness. There is no rebound, no guarding and no CVA tenderness.   Neurological: pt is alert and oriented to person, place, and time.   Skin: Skin is warm and dry.   Psychiatric: behavior is normal.   Nursing " note and vitals reviewed.                Assessment/Plan:      1. Acute cystitis with hematuria  UA results c/w cystitis    - POCT Urinalysis  - URINE CULTURE(NEW); Future  - nitrofurantoin (MACROBID) 100 MG Cap; Take 1 Capsule by mouth 2 times a day for 5 days.  Dispense: 10 Capsule; Refill: 0    Differential diagnosis, natural history, supportive care, and indications for immediate follow-up discussed. All questions answered. Patient agrees with the plan of care.     Follow-up as needed if symptoms worsen or fail to improve to PCP, Urgent care or Emergency Room.     I have personally reviewed prior external notes and test results pertinent to today's visit.  I have independently reviewed and interpreted all diagnostics ordered during this urgent care acute visit.

## 2024-03-18 DIAGNOSIS — N30.01 ACUTE CYSTITIS WITH HEMATURIA: ICD-10-CM

## 2024-04-18 DIAGNOSIS — F41.1 GAD (GENERALIZED ANXIETY DISORDER): ICD-10-CM

## 2024-04-18 RX ORDER — HYDROXYZINE HYDROCHLORIDE 25 MG/1
25 TABLET, FILM COATED ORAL 3 TIMES DAILY PRN
Qty: 90 TABLET | Refills: 2 | Status: SHIPPED | OUTPATIENT
Start: 2024-04-18

## 2024-06-05 ENCOUNTER — TELEMEDICINE (OUTPATIENT)
Dept: MEDICAL GROUP | Facility: MEDICAL CENTER | Age: 44
End: 2024-06-05
Payer: COMMERCIAL

## 2024-06-05 VITALS — HEIGHT: 67 IN | WEIGHT: 145 LBS | BODY MASS INDEX: 22.76 KG/M2

## 2024-06-05 DIAGNOSIS — F90.2 ATTENTION DEFICIT HYPERACTIVITY DISORDER (ADHD), COMBINED TYPE: ICD-10-CM

## 2024-06-05 DIAGNOSIS — E78.5 DYSLIPIDEMIA: ICD-10-CM

## 2024-06-05 PROCEDURE — 99214 OFFICE O/P EST MOD 30 MIN: CPT | Performed by: FAMILY MEDICINE

## 2024-06-05 RX ORDER — DEXTROAMPHETAMINE SACCHARATE, AMPHETAMINE ASPARTATE, DEXTROAMPHETAMINE SULFATE AND AMPHETAMINE SULFATE 3.75; 3.75; 3.75; 3.75 MG/1; MG/1; MG/1; MG/1
15 TABLET ORAL 2 TIMES DAILY
Qty: 60 TABLET | Refills: 0 | Status: SHIPPED | OUTPATIENT
Start: 2024-08-19 | End: 2024-09-18

## 2024-06-05 RX ORDER — DEXTROAMPHETAMINE SACCHARATE, AMPHETAMINE ASPARTATE, DEXTROAMPHETAMINE SULFATE AND AMPHETAMINE SULFATE 3.75; 3.75; 3.75; 3.75 MG/1; MG/1; MG/1; MG/1
15 TABLET ORAL 2 TIMES DAILY
Qty: 60 TABLET | Refills: 0 | Status: SHIPPED | OUTPATIENT
Start: 2024-06-20 | End: 2024-07-20

## 2024-06-05 RX ORDER — DEXTROAMPHETAMINE SACCHARATE, AMPHETAMINE ASPARTATE, DEXTROAMPHETAMINE SULFATE AND AMPHETAMINE SULFATE 3.75; 3.75; 3.75; 3.75 MG/1; MG/1; MG/1; MG/1
15 TABLET ORAL 2 TIMES DAILY
Qty: 60 TABLET | Refills: 0 | Status: SHIPPED | OUTPATIENT
Start: 2024-07-20 | End: 2024-08-19

## 2024-06-05 ASSESSMENT — ENCOUNTER SYMPTOMS
FEVER: 0
PALPITATIONS: 0
CHILLS: 0

## 2024-06-05 NOTE — ASSESSMENT & PLAN NOTE
Chronic condition stable, continue Adderall 15 mg twice daily, we tried Vyvanse which was not effective.  Controlled substance treatment signed in chart.  Up-to-date for urine drug screen.  Check PDMP.  3-month prescription sent to pharmacy.    Obtained and reviewed patient utilization report from [facility] pharmacy database on 6/5/2024 8:59 AM  prior to writing prescription for controlled substance II, III or IV per [place] bill . Based on assessment of the report,my physical exam if necessary and the patient's health problem, the prescription is medically necessary.

## 2024-06-05 NOTE — PROGRESS NOTES
Virtual Visit: Established Patient   This visit was conducted via Zoom using secure and encrypted videoconferencing technology.   The [place] [facility]    The patient's identity was confirmed and verbal consent was obtained for this virtual visit.       Problem List Items Addressed This Visit       Attention deficit hyperactivity disorder (ADHD), combined type     Chronic condition stable, continue Adderall 15 mg twice daily, we tried Vyvanse which was not effective.  Controlled substance treatment signed in chart.  Up-to-date for urine drug screen.  Check PDMP.  3-month prescription sent to pharmacy.    Obtained and reviewed patient utilization report from [facility] pharmacy database on 6/5/2024 8:59 AM  prior to writing prescription for controlled substance II, III or IV per [place] bill . Based on assessment of the report,my physical exam if necessary and the patient's health problem, the prescription is medically necessary.           Relevant Medications    amphetamine-dextroamphetamine (ADDERALL, 15MG,) 15 MG tablet (Start on 6/20/2024)    amphetamine-dextroamphetamine (ADDERALL, 15MG,) 15 MG tablet (Start on 7/20/2024)    amphetamine-dextroamphetamine (ADDERALL, 15MG,) 15 MG tablet (Start on 8/19/2024)    Dyslipidemia     Chronic condition, unstable, recheck labs to assess cholesterol numbers.         Relevant Orders    Comp Metabolic Panel    Lipid Profile         Follow-up in 3 months for medication follow-up        Chief complaint::Diagnoses of Attention deficit hyperactivity disorder (ADHD), combined type and Dyslipidemia were pertinent to this visit.      History of Present Illness        Problem   Dyslipidemia    Recent lipid panel showed elevated total cholesterol and LDL level.  She reports that she eats mostly healthy diet. She is physically active.    Lab Results   Component Value Date/Time    CHOLSTRLTOT 214 (H) 01/24/2023 0648    TRIGLYCERIDE 91 01/24/2023  "0648    HDL 63 01/24/2023 0648     (H) 01/24/2023 0648        Attention Deficit Hyperactivity Disorder (Adhd), Combined Type    Currently on Adderall 15 mg twice daily.  No side effects with medication.    Previous evaluation also in media section.  We tried Vyvanse medication which was not effective.  Last urine drug screen in March 24 consistent with prescription.  Controlled substance treatment signed in chart          Review of Systems   Constitutional:  Negative for chills and fever.   Cardiovascular:  Negative for chest pain, palpitations and leg swelling.          Medications and Allergies:     Current Outpatient Medications   Medication Sig Dispense Refill    [START ON 6/20/2024] amphetamine-dextroamphetamine (ADDERALL, 15MG,) 15 MG tablet Take 1 Tablet by mouth 2 times a day for 30 days. 60 Tablet 0    [START ON 7/20/2024] amphetamine-dextroamphetamine (ADDERALL, 15MG,) 15 MG tablet Take 1 Tablet by mouth 2 times a day for 30 days. 60 Tablet 0    [START ON 8/19/2024] amphetamine-dextroamphetamine (ADDERALL, 15MG,) 15 MG tablet Take 1 Tablet by mouth 2 times a day for 30 days. 60 Tablet 0    hydrOXYzine HCl (ATARAX) 25 MG Tab Take 1 Tablet by mouth 3 times a day as needed for Anxiety. 90 Tablet 2    fluconazole (DIFLUCAN) 100 MG Tab Take 1 Tablet by mouth every day. 1 Tablet 1    sulfamethoxazole-trimethoprim (BACTRIM DS) 800-160 MG tablet One-half to 1 tablet by mouth as a single dose immediately before or after sexual intercourse 30 Tablet 2     No current facility-administered medications for this visit.       Ht 1.702 m (5' 7\")   Wt 65.8 kg (145 lb) , Body mass index is 22.71 kg/m².      Physical Exam:  Constitutional: Alert, no distress, well-groomed.  Skin: No rashes in visible areas.  Eye: Round. Conjunctiva clear, lids normal. No icterus.   ENMT: Lips pink without lesions, good dentition, moist mucous membranes. Phonation normal.  Neck: No masses, no thyromegaly. Moves freely without " pain.  Respiratory: Unlabored respiratory effort, no cough or audible wheeze  Psych: Alert, normal affect and mood.       Results        Please note that this dictation was created using voice recognition software. I have made every reasonable attempt to correct obvious errors, but I expect that there are errors of grammar and possibly content that I did not discover before finalizing the note.

## 2024-09-17 ENCOUNTER — GYNECOLOGY VISIT (OUTPATIENT)
Dept: OBGYN | Facility: CLINIC | Age: 44
End: 2024-09-17
Payer: COMMERCIAL

## 2024-09-17 VITALS
BODY MASS INDEX: 24.58 KG/M2 | DIASTOLIC BLOOD PRESSURE: 72 MMHG | HEIGHT: 67 IN | WEIGHT: 156.6 LBS | SYSTOLIC BLOOD PRESSURE: 114 MMHG

## 2024-09-17 DIAGNOSIS — Z01.419 WOMEN'S ANNUAL ROUTINE GYNECOLOGICAL EXAMINATION: ICD-10-CM

## 2024-09-17 PROCEDURE — 3074F SYST BP LT 130 MM HG: CPT | Performed by: OBSTETRICS & GYNECOLOGY

## 2024-09-17 PROCEDURE — 99396 PREV VISIT EST AGE 40-64: CPT | Performed by: OBSTETRICS & GYNECOLOGY

## 2024-09-17 PROCEDURE — 3078F DIAST BP <80 MM HG: CPT | Performed by: OBSTETRICS & GYNECOLOGY

## 2024-09-17 NOTE — PROGRESS NOTES
ANNUAL GYNECOLOGY VISIT    Chief Complaint  Annual Exam  Gynecologic Exam      Subjective  Deb Kaye is a 44 y.o. female who presents today for Annual Exam. She has not complaints today. She had thought she needed her pap again today given her history of dysplasia. History of cervical dysplasia- see below.     Has LSC BS recently and took out her IUD. Menses are regular again, maybe a little longer than prior. Prior BV sx and UTI sx resolved.    Also started smoking ~ 1/2 pack per day again for last 6 months.    Preventive Care   Immunization History   Administered Date(s) Administered    Hepatitis B Vaccine (Adol/Adult) 10/09/2003    TD Vaccine 10/09/2003     Last Mammogram: Yes, date 2023- nml  Last Colonoscopy: Not indicated  Last DEXA: Not indicated  H/o osteoporosis or osteopenia: no  Intimate partner violence (patient interviewed when partner was not in the room): Negative    Gynecology History  Current Sexual Activity: single male  Abnormal discharge?  no   Menopause: No; No menopausal sx either  Urinary incontinence: no    Menstrual History  Patient's last menstrual period was 09/10/2024 (exact date).    Periods are regular every 28-30 days, lasting 5-7 days.   Dysmenorrhea: none  PMS symptoms?  no    Contraception  Current: s/p female sterilization  Past: LNG IUD    Pap History  Last: 2023   Any abnormal pap smears?  yes - see below:  2023-  NILM/HPV neg   2022- NILM, HPV other pos  10/2021: colpo: no dysplasia  2021- pap: ASCUS, HPV other pos     Obstetric History  OB History    Para Term  AB Living   2 2 2     2   SAB IAB Ectopic Molar Multiple Live Births             2      # Outcome Date GA Lbr Addison/2nd Weight Sex Type Anes PTL Lv   2 Term  40w0d   F CS-Unspec   DALE   1 Term  41w0d   F CS-Unspec   DALE       Past Medical History  Past Medical History:   Diagnosis Date    Constipation     Hip pain        Past Surgical History  Past Surgical History:   Procedure  Laterality Date    TUBAL LIGATION  05/2019    BREAST IMPLANT REVISION      Breast implant placed in June 2020    PRIMARY C SECTION         Social History  Social History     Socioeconomic History    Marital status:      Spouse name: Not on file    Number of children: Not on file    Years of education: Not on file    Highest education level: Associate degree: occupational, technical, or vocational program   Occupational History    Not on file   Tobacco Use    Smoking status: Every Day     Current packs/day: 0.50     Average packs/day: 0.5 packs/day for 0.7 years (0.4 ttl pk-yrs)     Types: Cigarettes     Start date: 2024    Smokeless tobacco: Never    Tobacco comments:     smoking on and off since age 18   Substance and Sexual Activity    Alcohol use: Yes     Alcohol/week: 4.2 oz     Types: 7 Glasses of wine per week    Drug use: Yes     Types: Marijuana    Sexual activity: Yes     Partners: Male     Birth control/protection: Female Sterilization     Comment: Work at [employer]   Other Topics Concern    Not on file   Social History Narrative    Not on file     Social Determinants of Health     Financial Resource Strain: Low Risk  (7/16/2021)    Overall Financial Resource Strain (CARDIA)     Difficulty of Paying Living Expenses: Not hard at all   Food Insecurity: No Food Insecurity (7/16/2021)    Hunger Vital Sign     Worried About Running Out of Food in the Last Year: Never true     Ran Out of Food in the Last Year: Never true   Transportation Needs: No Transportation Needs (7/16/2021)    PRAPARE - Transportation     Lack of Transportation (Medical): No     Lack of Transportation (Non-Medical): No   Physical Activity: Sufficiently Active (7/16/2021)    Exercise Vital Sign     Days of Exercise per Week: 7 days     Minutes of Exercise per Session: 40 min   Stress: No Stress Concern Present (7/16/2021)    [employer] [employer] - Occupational Stress Questionnaire     Feeling of Stress : Only  a little   Social Connections: Unknown (7/16/2021)    Social Connection and Isolation Panel [NHANES]     Frequency of Communication with Friends and Family: More than three times a week     Frequency of Social Gatherings with Friends and Family: Twice a week     Attends [employer]ism Services: Patient declined     Active Member of Clubs or Organizations: No     Attends Club or Organization Meetings: Patient declined     Marital Status:    Intimate Partner Violence: Not on file   Housing Stability: Low Risk  (7/16/2021)    Housing Stability Vital Sign     Unable to Pay for Housing in the Last Year: No     Number of Places Lived in the Last Year: 2     Unstable Housing in the Last Year: No       Family History  Family History   Problem Relation Age of Onset    Arthritis Mother         RA    Anxiety disorder Mother     Hypertension Mother     Hyperlipidemia Mother     Depression Mother     Cancer Maternal Grandmother         Colon cancer    Alzheimer's Disease Maternal Grandmother     Depression Maternal Grandmother     Anxiety disorder Maternal Grandmother     ADD / ADHD Sister     Depression Sister     Anxiety disorder Sister        Home Medications  Current Outpatient Medications on File Prior to Visit   Medication Sig Dispense Refill    amphetamine-dextroamphetamine (ADDERALL, 15MG,) 15 MG tablet Take 1 Tablet by mouth 2 times a day for 30 days. 60 Tablet 0    hydrOXYzine HCl (ATARAX) 25 MG Tab Take 1 Tablet by mouth 3 times a day as needed for Anxiety. 90 Tablet 2     No current facility-administered medications on file prior to visit.       Allergies/Reactions  No Known Allergies    ROS  Review of Systems:  Gen: no fevers or chills, no significant weight loss or gain  Breast: No pain, lump, nipple discharge, or skin changes.  Respiratory:  no cough or dyspnea  Cardiac:  no chest pain, no palpitations, no syncope  GI:  no heartburn, no abdominal pain, no nausea or vomiting  Psych: no depression or  "anxiety    Objective  /72 (BP Location: Right arm, Patient Position: Sitting, BP Cuff Size: Large adult)   Ht 5' 7\"   Wt 156 lb 9.6 oz   LMP 09/10/2024 (Exact Date)   BMI 24.53 kg/m²   Physical Exam  HENT:      Head: Normocephalic.   Eyes:      Extraocular Movements: Extraocular movements intact.      Conjunctiva/sclera: Conjunctivae normal.      Pupils: Pupils are equal, round, and reactive to light.   Cardiovascular:      Rate and Rhythm: Normal rate.   Pulmonary:      Effort: Pulmonary effort is normal.   Musculoskeletal:      Cervical back: Normal range of motion.   Neurological:      General: No focal deficit present.      Mental Status: She is alert.   Psychiatric:         Mood and Affect: Mood normal.         Behavior: Behavior normal.         Labs/Imaging:  HDL (mg/dL)   Date Value   01/24/2023 63     LDL (mg/dL)   Date Value   01/24/2023 133 (H)     No components found for: \"A1C1\"  No results found for: \"WBC\", \"HGB\", \"HCT\"  Lab Results   Component Value Date/Time    CO2 29 01/24/2023 0648    BUN 12 01/24/2023 0648     Health Maintenance Due   Topic Date Due    IMM DTaP/Tdap/Td Vaccine (1 - Tdap) 10/10/2003    Hepatitis B Vaccine (Hep B) (2 of 3 - 19+ 3-dose series) 11/06/2003    Influenza Vaccine (1) Never done    COVID-19 Vaccine (1 - 2023-24 season) Never done     Assessment & Plan  Deb Kaye is a 44 y.o. female who presents today for Annual Gyn Exam.     1. Health Maintenance   PAP: based on prior dyplasia, next pap/hpv co-test due 9/2026.   STI Screen (HIV, Syphilis, Chlamydia / Gonorrhea): Declines  MAMMOGRAM: Annual, next due 10/2024. Ordered  COLONOSCOPY: Start age 45.   DEXA: Not indicated   IMMUNIZATIONS: Recommended Flu and vaccine each season and COVID boosters when indicated.  TOBACCO: Reviewed health benefits of cessation. Recommended cessation. Declines interventions.   DIABETES SCREEN: Not indicated  CHOLESTEROL SCREEN: Ordered by PCP  COUNSELING: breast self exam, " mammography screening, and menopause  CONTRACEPTION: s/p BTL    Return: Annually or PRN    Ifeoma Victor MD  Obstetrics and Gynecology

## 2024-09-17 NOTE — PROGRESS NOTES
Pt here for Annual gyn visit  LMP : 9/10/24  BCM :ablation  Pap : 9/16/23 WNL (-) HPV  Mammo : 10/30/23 WNL, f/u routinely   Pt states no concerns at this time  Phone/Pharmacy verified: [phone]

## 2024-09-26 ENCOUNTER — PATIENT MESSAGE (OUTPATIENT)
Dept: MEDICAL GROUP | Facility: MEDICAL CENTER | Age: 44
End: 2024-09-26
Payer: COMMERCIAL

## 2024-09-27 ENCOUNTER — TELEMEDICINE (OUTPATIENT)
Dept: MEDICAL GROUP | Facility: MEDICAL CENTER | Age: 44
End: 2024-09-27
Payer: COMMERCIAL

## 2024-09-27 VITALS — BODY MASS INDEX: 23.54 KG/M2 | HEIGHT: 67 IN | WEIGHT: 150 LBS

## 2024-09-27 DIAGNOSIS — Z51.81 ENCOUNTER FOR MEDICATION MONITORING: ICD-10-CM

## 2024-09-27 DIAGNOSIS — F90.2 ATTENTION DEFICIT HYPERACTIVITY DISORDER (ADHD), COMBINED TYPE: ICD-10-CM

## 2024-09-27 PROCEDURE — 99214 OFFICE O/P EST MOD 30 MIN: CPT | Performed by: FAMILY MEDICINE

## 2024-09-27 RX ORDER — DEXTROAMPHETAMINE SACCHARATE, AMPHETAMINE ASPARTATE, DEXTROAMPHETAMINE SULFATE AND AMPHETAMINE SULFATE 3.75; 3.75; 3.75; 3.75 MG/1; MG/1; MG/1; MG/1
15 TABLET ORAL 2 TIMES DAILY
Qty: 60 TABLET | Refills: 0 | Status: SHIPPED | OUTPATIENT
Start: 2024-11-26 | End: 2024-12-26

## 2024-09-27 RX ORDER — DEXTROAMPHETAMINE SACCHARATE, AMPHETAMINE ASPARTATE, DEXTROAMPHETAMINE SULFATE AND AMPHETAMINE SULFATE 3.75; 3.75; 3.75; 3.75 MG/1; MG/1; MG/1; MG/1
15 TABLET ORAL 2 TIMES DAILY
Qty: 60 TABLET | Refills: 0 | Status: SHIPPED | OUTPATIENT
Start: 2024-10-27 | End: 2024-11-26

## 2024-09-27 RX ORDER — DEXTROAMPHETAMINE SACCHARATE, AMPHETAMINE ASPARTATE, DEXTROAMPHETAMINE SULFATE AND AMPHETAMINE SULFATE 3.75; 3.75; 3.75; 3.75 MG/1; MG/1; MG/1; MG/1
15 TABLET ORAL 2 TIMES DAILY
Qty: 60 TABLET | Refills: 0 | Status: SHIPPED | OUTPATIENT
Start: 2024-09-27 | End: 2024-10-27

## 2024-09-27 RX ORDER — DEXTROAMPHETAMINE SACCHARATE, AMPHETAMINE ASPARTATE, DEXTROAMPHETAMINE SULFATE AND AMPHETAMINE SULFATE 2.5; 2.5; 2.5; 2.5 MG/1; MG/1; MG/1; MG/1
15 TABLET ORAL 2 TIMES DAILY
COMMUNITY
End: 2024-09-27

## 2024-09-27 ASSESSMENT — ENCOUNTER SYMPTOMS
CHILLS: 0
FEVER: 0

## 2024-09-27 NOTE — PROGRESS NOTES
Virtual Visit: Established Patient   This visit was conducted via Teams using secure and encrypted videoconferencing technology.   The patient was in their home in the state of [place].    The patient's identity was confirmed and verbal consent was obtained for this virtual visit.     Verbal consent was acquired by the patient to use [employer] ambient listening note generation during this visit.      Deb was seen today for follow-up.    Diagnoses and all orders for this visit:    Attention deficit hyperactivity disorder (ADHD), combined type  -     amphetamine-dextroamphetamine (ADDERALL, 15MG,) 15 MG tablet; Take 1 Tablet by mouth 2 times a day for 30 days.  -     amphetamine-dextroamphetamine (ADDERALL, 15MG,) 15 MG tablet; Take 1 Tablet by mouth 2 times a day for 30 days.  -     amphetamine-dextroamphetamine (ADDERALL, 15MG,) 15 MG tablet; Take 1 Tablet by mouth 2 times a day for 30 days.    Encounter for medication monitoring                  Assessment & Plan  1. ADHD   chronic condition, stable her UDS is current, and the controlled substance treatment agreement is documented in her chart. The PDMP indicates that her last prescription was filled on 08/19/2024, suggesting she is due for a refill. Three prescriptions for Adderall 15 mg, to be taken twice daily, will be sent to her pharmacy. She reports no significant side effects from the medication.    Obtained and reviewed patient utilization report from [facility] pharmacy database on 9/27/2024 9:10 AM  prior to writing prescription for controlled substance II, III or IV per [place] bill . Based on assessment of the report,my physical exam if necessary and the patient's health problem, the prescription is medically necessary.      2. Health Maintenance.  She is advised to receive the influenza vaccine at her local pharmacy or at the office due to the high incidence of flu cases.    Follow-up  Return in 3 months for follow up.        Chief  "complaint::Diagnoses of Attention deficit hyperactivity disorder (ADHD), combined type and Encounter for medication monitoring were pertinent to this visit.      History of Present Illness  The patient is a 44-year-old female who presents via virtual visit for medication follow-up.    She reports that the Vyvanse medication was ineffective for her. However, she is responding well to Adderall 15 mg, taken twice daily, with no reported side effects.    Supplemental Information  She was seen by Gynecology last year and her HPV test came back negative.      Review of Systems   Constitutional:  Negative for chills and fever.   Psychiatric/Behavioral:          Attention deficit          Medications and Allergies:     Current Outpatient Medications   Medication Sig Dispense Refill    amphetamine-dextroamphetamine (ADDERALL, 15MG,) 15 MG tablet Take 1 Tablet by mouth 2 times a day for 30 days. 60 Tablet 0    [START ON 10/27/2024] amphetamine-dextroamphetamine (ADDERALL, 15MG,) 15 MG tablet Take 1 Tablet by mouth 2 times a day for 30 days. 60 Tablet 0    [START ON 11/26/2024] amphetamine-dextroamphetamine (ADDERALL, 15MG,) 15 MG tablet Take 1 Tablet by mouth 2 times a day for 30 days. 60 Tablet 0    hydrOXYzine HCl (ATARAX) 25 MG Tab Take 1 Tablet by mouth 3 times a day as needed for Anxiety. 90 Tablet 2     No current facility-administered medications for this visit.       Ht 1.702 m (5' 7\")   Wt 68 kg (150 lb) , Body mass index is 23.49 kg/m².      Physical Exam:  Constitutional: Alert, no distress, well-groomed.  Skin: No rashes in visible areas.  Eye: Round. Conjunctiva clear, lids normal. No icterus.   ENMT: Lips pink without lesions, good dentition, moist mucous membranes. Phonation normal.  Neck: No masses, no thyromegaly. Moves freely without pain.  Respiratory: Unlabored respiratory effort, no cough or audible wheeze  Psych: Alert, normal affect and mood.       Results        Please note that this dictation was " created using voice recognition software. I have made every reasonable attempt to correct obvious errors, but I expect that there are errors of grammar and possibly content that I did not discover before finalizing the note.

## 2025-01-14 ENCOUNTER — TELEMEDICINE (OUTPATIENT)
Dept: MEDICAL GROUP | Facility: MEDICAL CENTER | Age: 45
End: 2025-01-14
Payer: COMMERCIAL

## 2025-01-14 VITALS — WEIGHT: 157 LBS | HEIGHT: 67 IN | BODY MASS INDEX: 24.64 KG/M2

## 2025-01-14 DIAGNOSIS — F90.2 ATTENTION DEFICIT HYPERACTIVITY DISORDER (ADHD), COMBINED TYPE: ICD-10-CM

## 2025-01-14 DIAGNOSIS — M25.551 RIGHT HIP PAIN: ICD-10-CM

## 2025-01-14 PROCEDURE — 99214 OFFICE O/P EST MOD 30 MIN: CPT | Performed by: FAMILY MEDICINE

## 2025-01-14 RX ORDER — DEXTROAMPHETAMINE SACCHARATE, AMPHETAMINE ASPARTATE, DEXTROAMPHETAMINE SULFATE AND AMPHETAMINE SULFATE 3.75; 3.75; 3.75; 3.75 MG/1; MG/1; MG/1; MG/1
15 TABLET ORAL 2 TIMES DAILY
Qty: 60 TABLET | Refills: 0 | Status: SHIPPED | OUTPATIENT
Start: 2025-02-13 | End: 2025-03-15

## 2025-01-14 RX ORDER — DEXTROAMPHETAMINE SACCHARATE, AMPHETAMINE ASPARTATE, DEXTROAMPHETAMINE SULFATE AND AMPHETAMINE SULFATE 3.75; 3.75; 3.75; 3.75 MG/1; MG/1; MG/1; MG/1
15 TABLET ORAL 2 TIMES DAILY
Qty: 60 TABLET | Refills: 0 | Status: SHIPPED | OUTPATIENT
Start: 2025-01-14 | End: 2025-02-13

## 2025-01-14 RX ORDER — DEXTROAMPHETAMINE SACCHARATE, AMPHETAMINE ASPARTATE, DEXTROAMPHETAMINE SULFATE AND AMPHETAMINE SULFATE 3.75; 3.75; 3.75; 3.75 MG/1; MG/1; MG/1; MG/1
15 TABLET ORAL 2 TIMES DAILY
Qty: 60 TABLET | Refills: 0 | Status: SHIPPED | OUTPATIENT
Start: 2025-03-15 | End: 2025-04-14

## 2025-01-14 RX ORDER — DEXTROAMPHETAMINE SACCHARATE, AMPHETAMINE ASPARTATE, DEXTROAMPHETAMINE SULFATE AND AMPHETAMINE SULFATE 3.75; 3.75; 3.75; 3.75 MG/1; MG/1; MG/1; MG/1
15 TABLET ORAL 2 TIMES DAILY
COMMUNITY
End: 2025-01-14 | Stop reason: SDUPTHER

## 2025-01-14 ASSESSMENT — PATIENT HEALTH QUESTIONNAIRE - PHQ9: CLINICAL INTERPRETATION OF PHQ2 SCORE: 0

## 2025-01-14 ASSESSMENT — ENCOUNTER SYMPTOMS
FEVER: 0
CHILLS: 0

## 2025-01-14 NOTE — PROGRESS NOTES
Virtual Visit: Established Patient   This visit was conducted via Teams using secure and encrypted videoconferencing technology.   The patient was in a private location outside of their home in the state [facility].    The patient's identity was confirmed and verbal consent was obtained for this virtual visit.       Deb was seen today for follow-up.    Diagnoses and all orders for this visit:    Attention deficit hyperactivity disorder (ADHD), combined type  -     Controlled Substance Treatment Agreement  -     amphetamine-dextroamphetamine (ADDERALL, 15MG,) 15 MG tablet; Take 1 Tablet by mouth 2 times a day for 30 days.  -     amphetamine-dextroamphetamine (ADDERALL) 15 MG tablet; Take 1 Tablet by mouth 2 times a day for 30 days.  -     amphetamine-dextroamphetamine (ADDERALL, 15MG,) 15 MG tablet; Take 1 Tablet by mouth 2 times a day for 30 days.    Right hip pain         Problem List Items Addressed This Visit       Attention deficit hyperactivity disorder (ADHD), combined type     Chronic condition, stable, continue Adderall 15 mg 2 times daily.  Checked PDMP.  3-month supply medication sent to pharmacy.  We will do urine drug screen at next visit.    Obtained and reviewed patient utilization report from [facility] pharmacy database on 1/14/2025 2:56 PM  prior to writing prescription for controlled substance II, III or IV per [place] bill . Based on assessment of the report,my physical exam if necessary and the patient's health problem, the prescription is medically necessary.           Relevant Medications    amphetamine-dextroamphetamine (ADDERALL, 15MG,) 15 MG tablet    amphetamine-dextroamphetamine (ADDERALL) 15 MG tablet (Start on 2/13/2025)    amphetamine-dextroamphetamine (ADDERALL, 15MG,) 15 MG tablet (Start on 3/15/2025)    Other Relevant Orders    Controlled Substance Treatment Agreement    Right hip pain     Chronic condition, stable currently, discussed with patient different interventions  including physical therapy and steroid injection.  She would like to hold onto these currently.  She will let me know if these symptoms get exacerbated then we will put a referral to physical therapy or referral to see Ortho for steroid injection depending upon patient's symptoms at that time           Follow-up in 3 months for medication follow-up.      Chief complaint::Diagnoses of Attention deficit hyperactivity disorder (ADHD), combined type and Right hip pain were pertinent to this visit.      HPI:    44-year-old female here for medication refill.    Problem   Right Hip Pain    She has history of right hip pain.  Mother has history of rheumatoid arthritis.  X-ray right hip showed mild arthritis.  Sometimes gets exacerbation of pain during winter season.     Attention Deficit Hyperactivity Disorder (Adhd), Combined Type    Currently on Adderall 15 mg twice daily.  No side effects with medication.    Previous evaluation also in media section.  We tried Vyvanse medication which was not effective.  Last urine drug screen in March 2024 consistent with prescription.  Controlled substance treatment signed in chart            Review of Systems   Constitutional:  Negative for chills and fever.   Musculoskeletal:         Right hip pain sometimes          Medications and Allergies:     Current Outpatient Medications   Medication Sig Dispense Refill    amphetamine-dextroamphetamine (ADDERALL, 15MG,) 15 MG tablet Take 1 Tablet by mouth 2 times a day for 30 days. 60 Tablet 0    [START ON 2/13/2025] amphetamine-dextroamphetamine (ADDERALL) 15 MG tablet Take 1 Tablet by mouth 2 times a day for 30 days. 60 Tablet 0    [START ON 3/15/2025] amphetamine-dextroamphetamine (ADDERALL, 15MG,) 15 MG tablet Take 1 Tablet by mouth 2 times a day for 30 days. 60 Tablet 0    hydrOXYzine HCl (ATARAX) 25 MG Tab Take 1 Tablet by mouth 3 times a day as needed for Anxiety. 90 Tablet 2     No current facility-administered medications for this  "visit.        1.702 m (5' 7\")   Wt 71.2 kg (157 lb) , Body mass index is 24.59 kg/m².      Physical Exam:  Constitutional: Alert, no distress, well-groomed.  Skin: No rashes in visible areas.  Eye: Round. Conjunctiva clear, lids normal. No icterus.   ENMT: Lips pink without lesions, good dentition, moist mucous membranes. Phonation normal.  Neck: No masses, no thyromegaly. Moves freely without pain.  Respiratory: Unlabored respiratory effort, no cough or audible wheeze  Psych: Alert, normal affect and mood.       Results        Please note that this dictation was created using voice recognition software. I have made every reasonable attempt to correct obvious errors, but I expect that there are errors of grammar and possibly content that I did not discover before finalizing the note.    "

## 2025-01-14 NOTE — ASSESSMENT & PLAN NOTE
Chronic condition, stable currently, discussed with patient different interventions including physical therapy and steroid injection.  She would like to hold onto these currently.  She will let me know if these symptoms get exacerbated then we will put a referral to physical therapy or referral to see Ortho for steroid injection depending upon patient's symptoms at that time

## 2025-01-14 NOTE — ASSESSMENT & PLAN NOTE
Chronic condition, stable, continue Adderall 15 mg 2 times daily.  Checked PDMP.  3-month supply medication sent to pharmacy.  We will do urine drug screen at next visit.    Obtained and reviewed patient utilization report from [facility] pharmacy database on 1/14/2025 2:56 PM  prior to writing prescription for controlled substance II, III or IV per [place] bill . Based on assessment of the report,my physical exam if necessary and the patient's health problem, the prescription is medically necessary.

## 2025-01-29 ENCOUNTER — HOSPITAL ENCOUNTER (OUTPATIENT)
Dept: RADIOLOGY | Facility: MEDICAL CENTER | Age: 45
End: 2025-01-29
Attending: OBSTETRICS & GYNECOLOGY
Payer: COMMERCIAL

## 2025-01-29 DIAGNOSIS — Z01.419 WOMEN'S ANNUAL ROUTINE GYNECOLOGICAL EXAMINATION: ICD-10-CM

## 2025-01-29 PROCEDURE — 77067 SCR MAMMO BI INCL CAD: CPT

## 2025-02-03 ENCOUNTER — OFFICE VISIT (OUTPATIENT)
Dept: URGENT CARE | Facility: PHYSICIAN GROUP | Age: 45
End: 2025-02-03
Payer: COMMERCIAL

## 2025-02-03 VITALS
HEIGHT: 67 IN | SYSTOLIC BLOOD PRESSURE: 118 MMHG | DIASTOLIC BLOOD PRESSURE: 70 MMHG | HEART RATE: 82 BPM | TEMPERATURE: 97.6 F | OXYGEN SATURATION: 96 % | WEIGHT: 159.4 LBS | RESPIRATION RATE: 16 BRPM | BODY MASS INDEX: 25.02 KG/M2

## 2025-02-03 DIAGNOSIS — R35.0 FREQUENCY OF URINATION: ICD-10-CM

## 2025-02-03 DIAGNOSIS — N30.00 ACUTE CYSTITIS WITHOUT HEMATURIA: ICD-10-CM

## 2025-02-03 LAB
APPEARANCE UR: NORMAL
BILIRUB UR STRIP-MCNC: NEGATIVE MG/DL
COLOR UR AUTO: YELLOW
GLUCOSE UR STRIP.AUTO-MCNC: 100 MG/DL
KETONES UR STRIP.AUTO-MCNC: NORMAL MG/DL
LEUKOCYTE ESTERASE UR QL STRIP.AUTO: NORMAL
NITRITE UR QL STRIP.AUTO: POSITIVE
PH UR STRIP.AUTO: 5.5 [PH] (ref 5–8)
PROT UR QL STRIP: 30 MG/DL
RBC UR QL AUTO: NORMAL
SP GR UR STRIP.AUTO: 1
UROBILINOGEN UR STRIP-MCNC: 2 MG/DL

## 2025-02-03 PROCEDURE — 3074F SYST BP LT 130 MM HG: CPT | Performed by: NURSE PRACTITIONER

## 2025-02-03 PROCEDURE — 81002 URINALYSIS NONAUTO W/O SCOPE: CPT | Performed by: NURSE PRACTITIONER

## 2025-02-03 PROCEDURE — 99213 OFFICE O/P EST LOW 20 MIN: CPT | Performed by: NURSE PRACTITIONER

## 2025-02-03 PROCEDURE — 3078F DIAST BP <80 MM HG: CPT | Performed by: NURSE PRACTITIONER

## 2025-02-03 RX ORDER — NITROFURANTOIN 25; 75 MG/1; MG/1
100 CAPSULE ORAL 2 TIMES DAILY
Qty: 10 CAPSULE | Refills: 0 | Status: SHIPPED | OUTPATIENT
Start: 2025-02-03 | End: 2025-02-08

## 2025-02-03 NOTE — PROGRESS NOTES
Verbal consent was acquired by the patient to use [employer] ambient listening note generation during this visit          Chief Complaint   Patient presents with    UTI     Pt states she thinks she may have a UTI. Burning sensation, smelly urine, uncomfortable to urinate. Frequency and urgency. Symptoms started at 2am          History of Present Illness  Patient complains of dysuria and frequency x 2 days since new sexual partner.  History of frequent UTIs with sex in the past.  No back pain, fever or chills. No vaginal discharge or vaginal itching.          ROS:    No severe shortness of breath   No cardiac like chest pain, as discussed   As otherwise stated in HPI    Medical/SX/ Social History:  Reviewed per chart    Pertinent Medications:    Current Outpatient Medications on File Prior to Visit   Medication Sig Dispense Refill    [START ON 2/13/2025] amphetamine-dextroamphetamine (ADDERALL) 15 MG tablet Take 1 Tablet by mouth 2 times a day for 30 days. 60 Tablet 0    [START ON 3/15/2025] amphetamine-dextroamphetamine (ADDERALL, 15MG,) 15 MG tablet Take 1 Tablet by mouth 2 times a day for 30 days. 60 Tablet 0    amphetamine-dextroamphetamine (ADDERALL, 15MG,) 15 MG tablet Take 1 Tablet by mouth 2 times a day for 30 days. (Patient not taking: Reported on 2/3/2025) 60 Tablet 0    hydrOXYzine HCl (ATARAX) 25 MG Tab Take 1 Tablet by mouth 3 times a day as needed for Anxiety. (Patient not taking: Reported on 2/3/2025) 90 Tablet 2     No current facility-administered medications on file prior to visit.        Allergies:    Patient has no known allergies.     Problem list, medications, and allergies reviewed by myself today in Epic     Physical Exam:    Vitals:    02/03/25 1029   BP: 118/70   Pulse: 82   Resp: 16   Temp: 36.4 °C (97.6 °F)   SpO2: 96%             Physical Exam  Vitals and nursing note reviewed.   Constitutional:       General: She is not in acute distress.     Appearance: Normal appearance. She is not  ill-appearing or toxic-appearing.   HENT:      Head: Normocephalic and atraumatic.      Nose: Nose normal.      Mouth/Throat:      Mouth: Mucous membranes are moist.      Pharynx: Oropharynx is clear.   Eyes:      Extraocular Movements: Extraocular movements intact.      Conjunctiva/sclera: Conjunctivae normal.      Pupils: Pupils are equal, round, and reactive to light.   Cardiovascular:      Rate and Rhythm: Normal rate.      Pulses: Normal pulses.   Pulmonary:      Effort: Pulmonary effort is normal.   Abdominal:      Tenderness: There is no abdominal tenderness. There is no right CVA tenderness or left CVA tenderness.   Musculoskeletal:         General: Normal range of motion.      Cervical back: Normal range of motion.   Skin:     General: Skin is warm.      Capillary Refill: Capillary refill takes less than 2 seconds.   Neurological:      General: No focal deficit present.      Mental Status: She is alert and oriented to person, place, and time.            Diagnostics:      Lab Results/POC Test Results   Results for orders placed or performed in visit on 02/03/25   POCT Urinalysis    Collection Time: 02/03/25  9:55 AM   Result Value Ref Range    POC Color yellow Negative    POC Appearance slightly cloudy Negative    POC Glucose 100 Negative mg/dL    POC Bilirubin negative Negative mg/dL    POC Ketones trace Negative mg/dL    POC Specific Gravity 1.005 <1.005 - >1.030    POC Blood trace-intact Negative    POC Urine PH 5.5 5.0 - 8.0    POC Protein 30 Negative mg/dL    POC Urobiligen 2.0 Negative (0.2) mg/dL    POC Nitrites positive Negative    POC Leukocyte Esterase large Negative             Medical Decision making and plan :  I personally reviewed prior external notes and test results pertinent to today's visit. Pt is clinically stable at today's acute urgent care visit.  Patient appears nontoxic with no acute distress noted. Appropriate for outpatient care at this time.    Pleasant 44 y.o. female presented  clinic with:     Assessment & Plan   Acute cystitis.  The patient's presenting symptoms and exam findings are consistent with a simple urinary tract infection. They are overall very well-appearing with normal vital signs and benign examination findings.   Increase fluid intake.  Advised to return to the Urgent Care or follow up with their PCP if symptoms are not improving in 2-3 days or sooner if any worsening symptoms such as fever, chills, abdominal pain, back/flank pain, nausea, vomiting, or any other concerns.           Shared decision-making was utilized with patient for treatment plan. Medication discussed included indication for use and the potential benefits and side effects. Education was provided regarding the aforementioned assessments.  Differential Diagnosis, natural history, and supportive care discussed. All of the patient's questions were answered to their satisfaction at the time of discharge. Patient was encouraged to monitor symptoms closely. Those signs and symptoms which would warrant concern and mandate seeking a higher level of service through the emergency department discussed at length.  Patient stated agreement and understanding of this plan of care.    Disposition:  Home in stable condition     Voice Recognition Disclaimer:  Portions of this document were created using voice recognition software and NextInput technology provided by [facility]. The software does have a chance of producing errors of grammar and possibly content. I have made every reasonable attempt to correct obvious errors, but there may be errors of grammar and possibly content that I did not discover before finalizing the  documentation.    CARLOS Mccormick.

## 2025-03-26 ENCOUNTER — HOSPITAL ENCOUNTER (OUTPATIENT)
Facility: MEDICAL CENTER | Age: 45
End: 2025-03-26
Attending: FAMILY MEDICINE
Payer: COMMERCIAL

## 2025-03-26 ENCOUNTER — APPOINTMENT (OUTPATIENT)
Dept: MEDICAL GROUP | Facility: MEDICAL CENTER | Age: 45
End: 2025-03-26
Payer: COMMERCIAL

## 2025-03-26 ENCOUNTER — OFFICE VISIT (OUTPATIENT)
Dept: MEDICAL GROUP | Facility: MEDICAL CENTER | Age: 45
End: 2025-03-26
Payer: COMMERCIAL

## 2025-03-26 VITALS
HEART RATE: 82 BPM | HEIGHT: 66 IN | DIASTOLIC BLOOD PRESSURE: 76 MMHG | OXYGEN SATURATION: 97 % | BODY MASS INDEX: 25.23 KG/M2 | TEMPERATURE: 97.7 F | SYSTOLIC BLOOD PRESSURE: 122 MMHG | WEIGHT: 156.97 LBS

## 2025-03-26 DIAGNOSIS — Z11.3 ROUTINE SCREENING FOR STI (SEXUALLY TRANSMITTED INFECTION): ICD-10-CM

## 2025-03-26 DIAGNOSIS — E78.5 DYSLIPIDEMIA: ICD-10-CM

## 2025-03-26 DIAGNOSIS — Z51.81 MEDICATION MONITORING ENCOUNTER: ICD-10-CM

## 2025-03-26 DIAGNOSIS — F90.2 ATTENTION DEFICIT HYPERACTIVITY DISORDER (ADHD), COMBINED TYPE: ICD-10-CM

## 2025-03-26 DIAGNOSIS — Z13.1 SCREENING FOR DIABETES MELLITUS: ICD-10-CM

## 2025-03-26 PROCEDURE — 3078F DIAST BP <80 MM HG: CPT | Performed by: FAMILY MEDICINE

## 2025-03-26 PROCEDURE — 87591 N.GONORRHOEAE DNA AMP PROB: CPT

## 2025-03-26 PROCEDURE — 80307 DRUG TEST PRSMV CHEM ANLYZR: CPT

## 2025-03-26 PROCEDURE — 99214 OFFICE O/P EST MOD 30 MIN: CPT | Performed by: FAMILY MEDICINE

## 2025-03-26 PROCEDURE — 3074F SYST BP LT 130 MM HG: CPT | Performed by: FAMILY MEDICINE

## 2025-03-26 PROCEDURE — G0480 DRUG TEST DEF 1-7 CLASSES: HCPCS

## 2025-03-26 PROCEDURE — 87491 CHLMYD TRACH DNA AMP PROBE: CPT

## 2025-03-26 RX ORDER — DEXTROAMPHETAMINE SACCHARATE, AMPHETAMINE ASPARTATE, DEXTROAMPHETAMINE SULFATE AND AMPHETAMINE SULFATE 3.75; 3.75; 3.75; 3.75 MG/1; MG/1; MG/1; MG/1
15 TABLET ORAL 2 TIMES DAILY
Qty: 60 TABLET | Refills: 0 | Status: SHIPPED | OUTPATIENT
Start: 2025-04-25 | End: 2025-05-25

## 2025-03-26 RX ORDER — DEXTROAMPHETAMINE SACCHARATE, AMPHETAMINE ASPARTATE, DEXTROAMPHETAMINE SULFATE AND AMPHETAMINE SULFATE 3.75; 3.75; 3.75; 3.75 MG/1; MG/1; MG/1; MG/1
15 TABLET ORAL 2 TIMES DAILY
Qty: 60 TABLET | Refills: 0 | Status: SHIPPED | OUTPATIENT
Start: 2025-05-25 | End: 2025-06-24

## 2025-03-26 RX ORDER — CETIRIZINE HYDROCHLORIDE 10 MG/1
10 TABLET ORAL DAILY
COMMUNITY

## 2025-03-26 RX ORDER — DEXTROAMPHETAMINE SACCHARATE, AMPHETAMINE ASPARTATE, DEXTROAMPHETAMINE SULFATE AND AMPHETAMINE SULFATE 3.75; 3.75; 3.75; 3.75 MG/1; MG/1; MG/1; MG/1
15 TABLET ORAL 2 TIMES DAILY
Qty: 60 TABLET | Refills: 0 | Status: SHIPPED | OUTPATIENT
Start: 2025-03-26 | End: 2025-04-25

## 2025-03-26 ASSESSMENT — ENCOUNTER SYMPTOMS
FEVER: 0
CHILLS: 0
PALPITATIONS: 0

## 2025-03-26 NOTE — PROGRESS NOTES
Verbal consent was acquired by the patient to use [employer] ambient listening note generation during this visit.      Deb was seen today for follow-up.    Diagnoses and all orders for this visit:    Dyslipidemia  -     Comp Metabolic Panel; Future  -     Lipid Profile; Future    Attention deficit hyperactivity disorder (ADHD), combined type  -     amphetamine-dextroamphetamine (ADDERALL, 15MG,) 15 MG tablet; Take 1 Tablet by mouth 2 times a day for 30 days.  -     amphetamine-dextroamphetamine (ADDERALL, 15MG,) 15 MG tablet; Take 1 Tablet by mouth 2 times a day for 30 days.  -     amphetamine-dextroamphetamine (ADDERALL, 15MG,) 15 MG tablet; Take 1 Tablet by mouth 2 times a day for 30 days.    Medication monitoring encounter  -     URINE DRUG SCREEN W/CONF (AR); Future    Routine screening for STI (sexually transmitted infection)  -     ANTIBODY,TREPONEMA PALLIDUM; Future  -     HIV AG/AB COMBO ASSAY SCREENING; Future  -     HEP C VIRUS ANTIBODY; Future  -     Chlamydia/GC, PCR (Urine); Future  -     HSV I/II IGG & IGM SERUM; Future  -     HEP B SURFACE ANTIGEN; Future    Screening for diabetes mellitus  -     HEMOGLOBIN A1C; Future                  Assessment & Plan  1.  ADHD  Chronic condition, stable, controlled substance treatment signed in chart.  Urine drug screen sample collected today.  Previous evaluation is scanned in media section.  We tried Vyvanse which was not effective.  Continue Adderall 15 mg twice daily.  Checked PDMP      Obtained and reviewed patient utilization report from [facility] pharmacy database on 3/26/2025 8:41 AM  prior to writing prescription for controlled substance II, III or IV per [place] bill . Based on assessment of the report,my physical exam if necessary and the patient's health problem, the prescription is medically necessary.      2. STD screening  - Urine sample to test for chlamydia and gonorrhea  - Blood tests to screen for Hepatitis B surface antigen, HIV,  Hepatitis C, herpes IgM and IgG antibodies, and HPV    3.  Dyslipidemia  Chronic condition, unstable, recheck labs.    Follow-up in 3 months for medication and lab follow-up.          Chief complaint::Diagnoses of Dyslipidemia, Attention deficit hyperactivity disorder (ADHD), combined type, Medication monitoring encounter, Routine screening for STI (sexually transmitted infection), and Screening for diabetes mellitus were pertinent to this visit.      History of Present Illness  The patient is a 44-year-old female who presents for a medication follow-up.    STD Check  - Expressed a desire to undergo an STD check, including blood work  - Reports no current symptoms  - Mentions a recent breakup with her partner and is uncertain about his fidelity  - Recalls a previous episode of bacterial vaginosis (BV) that required a 6-month course of antibiotics    Cholesterol Levels  - Has not had her cholesterol levels checked recently  - Has been fasting today, with the exception of consuming coffee with cream        Review of Systems   Constitutional:  Negative for chills and fever.   Cardiovascular:  Negative for chest pain, palpitations and leg swelling.   Psychiatric/Behavioral:          Attention and focus deficit          Medications and Allergies:     Current Outpatient Medications   Medication Sig Dispense Refill    cetirizine (ZYRTEC) 10 MG Tab Take 10 mg by mouth every day.      amphetamine-dextroamphetamine (ADDERALL, 15MG,) 15 MG tablet Take 1 Tablet by mouth 2 times a day for 30 days. 60 Tablet 0    [START ON 4/25/2025] amphetamine-dextroamphetamine (ADDERALL, 15MG,) 15 MG tablet Take 1 Tablet by mouth 2 times a day for 30 days. 60 Tablet 0    [START ON 5/25/2025] amphetamine-dextroamphetamine (ADDERALL, 15MG,) 15 MG tablet Take 1 Tablet by mouth 2 times a day for 30 days. 60 Tablet 0    hydrOXYzine HCl (ATARAX) 25 MG Tab Take 1 Tablet by mouth 3 times a day as needed for Anxiety. 90 Tablet 2     No current  "facility-administered medications for this visit.       /76 (BP Location: Left arm, Patient Position: Sitting, BP Cuff Size: Adult)   Pulse 82   Temp 36.5 °C (97.7 °F) (Temporal)   Ht 1.676 m (5' 6\")   Wt 71.2 kg (156 lb 15.5 oz)   SpO2 97% , Body mass index is 25.34 kg/m².      Physical Exam  Constitutional:       Appearance: Normal appearance. She is well-developed and well-groomed.   HENT:      Head: Normocephalic and atraumatic.      Right Ear: External ear normal.      Left Ear: External ear normal.   Eyes:      General:         Right eye: No discharge.         Left eye: No discharge.      Conjunctiva/sclera: Conjunctivae normal.   Cardiovascular:      Rate and Rhythm: Normal rate.   Pulmonary:      Effort: Pulmonary effort is normal. No respiratory distress.   Musculoskeletal:      Cervical back: Neck supple.   Skin:     Findings: No rash.   Neurological:      Mental Status: She is alert.   Psychiatric:         Mood and Affect: Mood and affect normal.         Behavior: Behavior normal.              Please note that this dictation was created using voice recognition software. I have made every reasonable attempt to correct obvious errors, but I expect that there are errors of grammar and possibly content that I did not discover before finalizing the note.      "

## 2025-03-27 ENCOUNTER — RESULTS FOLLOW-UP (OUTPATIENT)
Dept: MEDICAL GROUP | Facility: MEDICAL CENTER | Age: 45
End: 2025-03-27

## 2025-03-27 LAB
C TRACH DNA SPEC QL NAA+PROBE: NEGATIVE
N GONORRHOEA DNA SPEC QL NAA+PROBE: NEGATIVE
SPECIMEN SOURCE: NORMAL

## 2025-03-28 ENCOUNTER — RESULTS FOLLOW-UP (OUTPATIENT)
Dept: MEDICAL GROUP | Facility: MEDICAL CENTER | Age: 45
End: 2025-03-28

## 2025-03-28 LAB
AMPHET CTO UR CFM-MCNC: NORMAL NG/ML
BARBITURATES CTO UR CFM-MCNC: NEGATIVE NG/ML
BENZODIAZ CTO UR CFM-MCNC: NEGATIVE NG/ML
CANNABINOIDS CTO UR CFM-MCNC: NEGATIVE NG/ML
COCAINE CTO UR CFM-MCNC: NEGATIVE NG/ML
CREAT UR-MCNC: 62.9 MG/DL (ref 20–400)
DRUG COMMENT 753798: NORMAL
METHADONE CTO UR CFM-MCNC: NEGATIVE NG/ML
OPIATES CTO UR CFM-MCNC: NEGATIVE NG/ML
PCP CTO UR CFM-MCNC: NEGATIVE NG/ML
PROPOXYPH CTO UR CFM-MCNC: NEGATIVE NG/ML

## 2025-03-30 LAB
AMPHET UR CFM-MCNC: 2854 NG/ML
MDA UR CFM-MCNC: <200 NG/ML
MDEA UR CFM-MCNC: <200 NG/ML
MDMA UR CFM-MCNC: <200 NG/ML
METHAMPHET UR CFM-MCNC: <200 NG/ML
PHENTERMINE UR CFM-MCNC: <200 NG/ML

## 2025-04-26 ENCOUNTER — HOSPITAL ENCOUNTER (OUTPATIENT)
Dept: LAB | Facility: MEDICAL CENTER | Age: 45
End: 2025-04-26
Attending: FAMILY MEDICINE
Payer: COMMERCIAL

## 2025-04-26 DIAGNOSIS — Z11.3 ROUTINE SCREENING FOR STI (SEXUALLY TRANSMITTED INFECTION): ICD-10-CM

## 2025-04-26 DIAGNOSIS — Z13.1 SCREENING FOR DIABETES MELLITUS: ICD-10-CM

## 2025-04-26 DIAGNOSIS — E78.5 DYSLIPIDEMIA: ICD-10-CM

## 2025-04-26 LAB
EST. AVERAGE GLUCOSE BLD GHB EST-MCNC: 114 MG/DL
HBA1C MFR BLD: 5.6 % (ref 4–5.6)
HBV SURFACE AG SER QL: NORMAL
HCV AB SER QL: NORMAL
HIV 1+2 AB+HIV1 P24 AG SERPL QL IA: NORMAL
T PALLIDUM AB SER QL IA: NORMAL

## 2025-04-26 PROCEDURE — 82784 ASSAY IGA/IGD/IGG/IGM EACH: CPT

## 2025-04-26 PROCEDURE — 83036 HEMOGLOBIN GLYCOSYLATED A1C: CPT

## 2025-04-26 PROCEDURE — 87340 HEPATITIS B SURFACE AG IA: CPT

## 2025-04-26 PROCEDURE — 86803 HEPATITIS C AB TEST: CPT

## 2025-04-26 PROCEDURE — 86694 HERPES SIMPLEX NES ANTBDY: CPT

## 2025-04-26 PROCEDURE — 86695 HERPES SIMPLEX TYPE 1 TEST: CPT

## 2025-04-26 PROCEDURE — 86780 TREPONEMA PALLIDUM: CPT

## 2025-04-26 PROCEDURE — 36415 COLL VENOUS BLD VENIPUNCTURE: CPT

## 2025-04-26 PROCEDURE — 86696 HERPES SIMPLEX TYPE 2 TEST: CPT

## 2025-04-26 PROCEDURE — 87389 HIV-1 AG W/HIV-1&-2 AB AG IA: CPT

## 2025-04-28 ENCOUNTER — RESULTS FOLLOW-UP (OUTPATIENT)
Dept: MEDICAL GROUP | Facility: MEDICAL CENTER | Age: 45
End: 2025-04-28

## 2025-04-29 LAB
HSV1 GG IGG SER-ACNC: 33.1 IV
HSV1+2 IGG SER IA-ACNC: >22.4 IV
HSV2 GG IGG SER-ACNC: 0.09 IV
IGG SERPL-MCNC: 694 MG/DL (ref 768–1632)

## 2025-06-26 ENCOUNTER — APPOINTMENT (OUTPATIENT)
Dept: MEDICAL GROUP | Facility: MEDICAL CENTER | Age: 45
End: 2025-06-26
Payer: COMMERCIAL

## 2025-07-02 ENCOUNTER — TELEMEDICINE (OUTPATIENT)
Dept: MEDICAL GROUP | Facility: MEDICAL CENTER | Age: 45
End: 2025-07-02
Payer: COMMERCIAL

## 2025-07-02 VITALS — BODY MASS INDEX: 24.33 KG/M2 | WEIGHT: 155 LBS | HEIGHT: 67 IN

## 2025-07-02 DIAGNOSIS — D80.3 IGG DEFICIENCY (HCC): Primary | ICD-10-CM

## 2025-07-02 DIAGNOSIS — Z12.11 COLON CANCER SCREENING: ICD-10-CM

## 2025-07-02 DIAGNOSIS — F90.2 ATTENTION DEFICIT HYPERACTIVITY DISORDER (ADHD), COMBINED TYPE: ICD-10-CM

## 2025-07-02 DIAGNOSIS — E78.5 DYSLIPIDEMIA: ICD-10-CM

## 2025-07-02 PROCEDURE — 99214 OFFICE O/P EST MOD 30 MIN: CPT | Mod: 95 | Performed by: FAMILY MEDICINE

## 2025-07-02 RX ORDER — DEXTROAMPHETAMINE SACCHARATE, AMPHETAMINE ASPARTATE, DEXTROAMPHETAMINE SULFATE AND AMPHETAMINE SULFATE 3.75; 3.75; 3.75; 3.75 MG/1; MG/1; MG/1; MG/1
15 TABLET ORAL 2 TIMES DAILY
Qty: 60 TABLET | Refills: 0 | Status: SHIPPED | OUTPATIENT
Start: 2025-07-02 | End: 2025-08-01

## 2025-07-02 RX ORDER — DEXTROAMPHETAMINE SACCHARATE, AMPHETAMINE ASPARTATE, DEXTROAMPHETAMINE SULFATE AND AMPHETAMINE SULFATE 3.75; 3.75; 3.75; 3.75 MG/1; MG/1; MG/1; MG/1
15 TABLET ORAL 2 TIMES DAILY
Qty: 60 TABLET | Refills: 0 | Status: SHIPPED | OUTPATIENT
Start: 2025-08-31 | End: 2025-09-30

## 2025-07-02 RX ORDER — DEXTROAMPHETAMINE SACCHARATE, AMPHETAMINE ASPARTATE, DEXTROAMPHETAMINE SULFATE AND AMPHETAMINE SULFATE 3.75; 3.75; 3.75; 3.75 MG/1; MG/1; MG/1; MG/1
15 TABLET ORAL 2 TIMES DAILY
Qty: 60 TABLET | Refills: 0 | Status: SHIPPED | OUTPATIENT
Start: 2025-08-01 | End: 2025-08-31

## 2025-07-02 ASSESSMENT — ENCOUNTER SYMPTOMS
FEVER: 0
CHILLS: 0

## 2025-07-02 NOTE — PROGRESS NOTES
Virtual Visit: Established Patient   This visit was conducted via [employer] using secure and encrypted videoconferencing technology.   The patient was in their home in the state of [place].    The patient's identity was confirmed and verbal consent was obtained for this virtual visit.       Deb was seen today for follow-up.    Diagnoses and all orders for this visit:    IgG deficiency (HCC):  Chronic condition, mildly low IgG antibodies.  Check IgG subclass    -     IGG SUBCLASSES (1234); Future      Dyslipidemia  Chronic condition, stable, recheck labs  -     Lipid Profile; Future  -     Comp Metabolic Panel; Future    Attention deficit hyperactivity disorder (ADHD), combined type  Chronic condition, stable, continue Adderall 15 mg twice daily.  Controlled substance agreement signed in chart.  Up-to-date for urine drug screen.    Obtained and reviewed patient utilization report from [facility] [place] [facility] pharmacy database on 7/2/2025 3:42 PM  prior to writing prescription for controlled substance II, III or IV per [place] bill . Based on assessment of the report,my physical exam if necessary and the patient's health problem, the prescription is medically necessary.        -     amphetamine-dextroamphetamine (ADDERALL, 15MG,) 15 MG tablet; Take 1 Tablet by mouth 2 times a day for 30 days.  -     amphetamine-dextroamphetamine (ADDERALL, 15MG,) 15 MG tablet; Take 1 Tablet by mouth 2 times a day for 30 days.  -     amphetamine-dextroamphetamine (ADDERALL, 15MG,) 15 MG tablet; Take 1 Tablet by mouth 2 times a day for 30 days.    Colon cancer screening  -     Referral to GI for Colonoscopy       Smoking cessation  We discussed about options including nicotine patches, Wellbutrin and Chantix.  She smokes about half pack a day.  Recommend to use nicotine patches 14 mg for 2 weeks then reduce it to 7 mg for 2 weeks then use 7 mg patch every other day.  She will get these patches over-the-counter.    Follow-up in 3 months for  "medication follow-up.             Chief complaint::The primary encounter diagnosis was IgG deficiency (HCC). Diagnoses of Dyslipidemia, Attention deficit hyperactivity disorder (ADHD), combined type, and Colon cancer screening were also pertinent to this visit.    HPI:    44-year-old female here for medication refill.    She has history of ADHD and currently on Adderall 15 mg twice daily.  She has been doing well with this medication.  No side effects.  Last blood test showed mildly low IgG antibodies.  I did not ordered this blood test but was resulted by lab.    Has history of dyslipidemia.  She would like to quit smoking at her 45th birthday and asked about options for that.    Review of Systems   Constitutional:  Positive for malaise/fatigue. Negative for chills and fever.   Psychiatric/Behavioral:          Attention deficit without medications          Medications and Allergies:       Current Outpatient Medications:     amphetamine-dextroamphetamine, 15 mg, Oral, BID, Taking    [START ON 8/1/2025] amphetamine-dextroamphetamine, 15 mg, Oral, BID, Taking    [START ON 8/31/2025] amphetamine-dextroamphetamine, 15 mg, Oral, BID, Taking    cetirizine, 10 mg, Oral, DAILY, Taking    hydrOXYzine HCl, 25 mg, Oral, TID PRN, Taking As Needed     Ht 1.702 m (5' 7\")   Wt 70.3 kg (155 lb) , Body mass index is 24.28 kg/m².      Physical Exam:  Constitutional: Alert, no distress, well-groomed.  Skin: No rashes in visible areas.  Eye: Round. Conjunctiva clear, lids normal. No icterus.   ENMT: Lips pink without lesions, good dentition, moist mucous membranes. Phonation normal.  Neck: No masses, no thyromegaly. Moves freely without pain.  Respiratory: Unlabored respiratory effort, no cough or audible wheeze  Psych: Alert, normal affect and mood.       Results        Please note that this dictation was created using voice recognition software. I have made every reasonable attempt to correct obvious errors, but I expect that there " are errors of grammar and possibly content that I did not discover before finalizing the note.

## 2025-07-09 NOTE — Clinical Note
REFERRAL APPROVAL NOTICE         Sent on July 9, 2025                   eDb Kaye  [address] [address]                   Dear Ms. Kaye,    After a careful review of the medical information and benefit coverage, [facility] has processed your referral. See below for additional details.    If applicable, you must be actively enrolled with your insurance for coverage of the authorized service. If you have any questions regarding your coverage, please contact your insurance directly.    REFERRAL INFORMATION   Referral #:  [id number]  Referred-To Provider    Referred-By Provider:  Gastroenterology    Segun Rodrigez M.D.   [facility]      [address]  [address]  [place] [address] [phone] [address] [facility] [address]  [phone]    Referral Start Date:  07/02/2025  Referral End Date:   07/02/2026             SCHEDULING  If you do not already have an appointment, please call [phone] to make an appointment.     MORE INFORMATION  If you do not already have a [employer] account, sign up at: [email or web address]  You can access your medical information, make appointments, see lab results, billing information, and more.  If you have questions regarding this referral, please contact  the [facility] Referrals department at:             [phone]. Monday - Friday 8:00AM - 5:00PM.     Sincerely,    [facility]